# Patient Record
Sex: FEMALE | Race: WHITE | URBAN - METROPOLITAN AREA
[De-identification: names, ages, dates, MRNs, and addresses within clinical notes are randomized per-mention and may not be internally consistent; named-entity substitution may affect disease eponyms.]

---

## 2018-10-31 ENCOUNTER — HOSPITAL ENCOUNTER (OUTPATIENT)
Dept: PHYSICAL THERAPY | Age: 73
Setting detail: THERAPIES SERIES
Discharge: HOME | End: 2018-10-31
Payer: MEDICARE

## 2018-10-31 PROCEDURE — G8981 BODY POS CURRENT STATUS: HCPCS

## 2018-10-31 PROCEDURE — 97162 PT EVAL MOD COMPLEX 30 MIN: CPT

## 2018-10-31 PROCEDURE — 97140 MANUAL THERAPY 1/> REGIONS: CPT

## 2018-10-31 PROCEDURE — G8982 BODY POS GOAL STATUS: HCPCS

## 2018-10-31 ASSESSMENT — PAIN SCALES - GENERAL: PAINLEVEL_OUTOF10: 2

## 2018-10-31 NOTE — PROGRESS NOTES
WNL  AROM LLE (degrees)  LLE AROM : WNL  Spine  Lumbar: Full flexion. 50% decreased B SB with pain on R LB.  75% decreased B Rotation. 50% decreased extension with pain in R LB. Joint Mobility  Spine: R hip joint hypomobility  ROM RLE: Hypomobility R tibial IR. Strength RLE  Strength RLE: WFL  Comment: Hip ER 4-/5; Hip IR 4/5; Hip flexion 4/5; PGM 3-/5  Strength LLE  Strength LLE: WFL  Comment: Hip ER 4-/5; Hip IR 4/5; Hip flexion 4/5; PGM 3-/5     Additional Measures  Flexibility: Hip ER 20 degrees B hypomobility R hip joint with log roll. Decreased R hip ER and L IR. Special Tests: Decreased sensation to pin prick R LE dermatomes. Normal DTR B LEs. (+) relevant R slump test   Other: AGMR R hip. ERS lumbar spine. Assessment   Conditions Requiring Skilled Therapeutic Intervention  Assessment: Pt presents wtih swayback standing posture wtih decreased gluteal and core activation, increased lumbar compression. Movement impairments of the R lower quarter including R AGMR and ERS. Weakness in B gluteals specifically R PGM. Prognosis: Good  Decision Making: Medium Complexity  REQUIRES PT FOLLOW UP: Yes         Plan   Plan  Times per week: 2x/wk for 4 weeks   Current Treatment Recommendations: Strengthening, ROM, Gait Training, Endurance Training, Neuromuscular Re-education, Home Exercise Program, Manual Therapy - Soft Tissue Mobilization, Manual Therapy - Joint Manipulation, Patient/Caregiver Education & Training    G-Code  PT G-Codes  Functional Assessment Tool Used: LEFS  Score: 26% disability   Functional Limitation: Changing and maintaining body position  Changing and Maintaining Body Position Current Status (): At least 20 percent but less than 40 percent impaired, limited or restricted  Changing and Maintaining Body Position Goal Status ():  At least 1 percent but less than 20 percent impaired, limited or restricted    Goals  Long term goals  Time Frame for Long term goals : 4 weeks

## 2018-11-05 ENCOUNTER — HOSPITAL ENCOUNTER (OUTPATIENT)
Dept: PHYSICAL THERAPY | Age: 73
Setting detail: THERAPIES SERIES
Discharge: HOME | End: 2018-11-05
Payer: MEDICARE

## 2018-11-05 PROCEDURE — 97110 THERAPEUTIC EXERCISES: CPT

## 2018-11-05 PROCEDURE — 97140 MANUAL THERAPY 1/> REGIONS: CPT

## 2018-11-05 NOTE — FLOWSHEET NOTE
Squat test:  good overall form wtih lumbar flexion. SLS wtih increased hip IR and K' valgus on R.  SLS squat B hip IR and K' valgus R worse than L.  AMB:  R hip weakness noted, trendelenberg, LOB to R. Increased R hip IR and knee valgus. Decresaed gluteal activation, push off. Femoral amb. Lumbar: Full flexion. 50% decreased B SB with pain on R LB.  75% decreased B Rotation. 50% decreased extension with pain in R LB. Joint Mobility  Spine: R hip joint hypomobility  ROM RLE: Hypomobility R tibial IR.       Strength RLE  Strength RLE: WFL  Comment: Hip ER 4-/5; Hip IR 4/5; Hip flexion 4/5; PGM 3-/5  Strength LLE  Strength LLE: WFL  Comment: Hip ER 4-/5; Hip IR 4/5; Hip flexion 4/5; PGM 3-/5  Additional Measures  Flexibility: Hip ER 20 degrees B hypomobility R hip joint with log roll. Decreased R hip ER and L IR. Special Tests: Decreased sensation to pin prick R LE dermatomes. Normal DTR B LEs. (+) relevant R slump test   Other: AGMR R hip. ERS lumbar spine. ASSESSMENT:   Pt responded well to therapy today without increased pain.        Treatment/Activity Tolerance:   [x] Patient tolerated treatment well [] Patient limited by fatique  [] Patient limited by pain [] Patient limited by other medical complications  [] Other:     Goals:        Long term goals  Time Frame for Long term goals : 4 weeks   Long term goal 1: Pt independent with HEP  Long term goal 2: Pt amb without limits to pain  Long term goal 3: Pt hip strength improve by 1/3 muscle grade grossly  Long term goal 4: Normal hip joint mobility an d hip IR/ER  Long term goal 5: Pt able to walk without limits to pain     Plan: [x] Continue per plan of care [] Alter current plan (see comments)   [] Plan of care initiated [] Hold pending MD visit [] Discharge      Plan for Next Session:      Re-Certification Due Date:         Signature:  Nirav Arredondo, PT

## 2018-11-14 ENCOUNTER — HOSPITAL ENCOUNTER (OUTPATIENT)
Dept: PHYSICAL THERAPY | Age: 73
Setting detail: THERAPIES SERIES
Discharge: HOME | End: 2018-11-14
Payer: MEDICARE

## 2018-11-14 PROCEDURE — 97140 MANUAL THERAPY 1/> REGIONS: CPT

## 2018-11-14 PROCEDURE — 97110 THERAPEUTIC EXERCISES: CPT

## 2018-11-14 NOTE — FLOWSHEET NOTE
Modalities:      Test/Measurements:    11/14/18   R lumbar closing restriction    Observation: Standing:  L pelvic rotation, R trunk rotation. Sway back wtih Y-ligament standing. Body Mechanics: Squat test:  good overall form wtih lumbar flexion. SLS wtih increased hip IR and K' valgus on R.  SLS squat B hip IR and K' valgus R worse than L.  AMB:  R hip weakness noted, trendelenberg, LOB to R. Increased R hip IR and knee valgus. Decresaed gluteal activation, push off. Femoral amb. Lumbar: Full flexion. 50% decreased B SB with pain on R LB.  75% decreased B Rotation. 50% decreased extension with pain in R LB. Joint Mobility  Spine: R hip joint hypomobility  ROM RLE: Hypomobility R tibial IR.       Strength RLE  Strength RLE: WFL  Comment: Hip ER 4-/5; Hip IR 4/5; Hip flexion 4/5; PGM 3-/5  Strength LLE  Strength LLE: WFL  Comment: Hip ER 4-/5; Hip IR 4/5; Hip flexion 4/5; PGM 3-/5  Additional Measures  Flexibility: Hip ER 20 degrees B hypomobility R hip joint with log roll. Decreased R hip ER and L IR. Special Tests: Decreased sensation to pin prick R LE dermatomes. Normal DTR B LEs. (+) relevant R slump test   Other: AGMR R hip. ERS lumbar spine. ASSESSMENT:   Pt responded well to therapy today with decreased pain.        Treatment/Activity Tolerance:   [x] Patient tolerated treatment well [] Patient limited by fatique  [] Patient limited by pain [] Patient limited by other medical complications  [] Other:     Goals:        Long term goals  Time Frame for Long term goals : 4 weeks   Long term goal 1: Pt independent with HEP  Long term goal 2: Pt amb without limits to pain  Long term goal 3: Pt hip strength improve by 1/3 muscle grade grossly  Long term goal 4: Normal hip joint mobility an d hip IR/ER  Long term goal 5: Pt able to walk without limits to pain     Plan: [x] Continue per plan of care [] Alter current plan (see comments)   [] Plan of care initiated [] Hold pending MD visit []

## 2018-11-20 ENCOUNTER — HOSPITAL ENCOUNTER (OUTPATIENT)
Dept: PHYSICAL THERAPY | Age: 73
Setting detail: THERAPIES SERIES
Discharge: HOME | End: 2018-11-20
Payer: MEDICARE

## 2018-11-20 PROCEDURE — 97110 THERAPEUTIC EXERCISES: CPT

## 2018-11-20 PROCEDURE — 97140 MANUAL THERAPY 1/> REGIONS: CPT

## 2018-11-28 ENCOUNTER — HOSPITAL ENCOUNTER (OUTPATIENT)
Dept: PHYSICAL THERAPY | Age: 73
Setting detail: THERAPIES SERIES
Discharge: HOME | End: 2018-11-28
Payer: MEDICARE

## 2018-11-28 PROCEDURE — 97110 THERAPEUTIC EXERCISES: CPT

## 2018-11-28 PROCEDURE — 97140 MANUAL THERAPY 1/> REGIONS: CPT

## 2018-11-28 NOTE — FLOWSHEET NOTE
Physical Therapy Daily Treatment Note    Date:  2018    Patient Name:  Nicki Johns    :  [birth year]  MRN: [id number]  Restrictions/Precautions:    Medical/Treatment Diagnosis Information:  · Diagnosis: R Hip Pain  Insurance/Certification information:  PT Insurance Information: [employer], [place] [employer] HMO  Physician Information:  Referring Practitioner: Asha Che  Plan of care signed (Y/N): Y  Visit# / total visits:      G-Code (if applicable):         PT G-Codes  Functional Assessment Tool Used: LEFS  Score: 26% disability   Functional Limitation: Changing and maintaining body position  Changing and Maintaining Body Position Current Status (): At least 20 percent but less than 40 percent impaired, limited or restricted  Changing and Maintaining Body Position Goal Status (): At least 1 percent but less than 20 percent impaired, limited or restricted    Time in:   1:30      Timed Treatment:   30  Total Treatment Time:   30  ________________________________________________________________________________________    Pain Level:    1-2/10  SUBJECTIVE:  R side gluteal is still tight. Decreased pain in the R buttock in the am.  Definitely noticing strength gains since beginning therapy. R hip motion continues to be limited. Prone hip extension is difficult and increases LBP. OBJECTIVE:     Exercise/Equipment Resistance/Repetitions Other comments          TA sets with BP cuff       March    HEP video   Clamshells 1  HEP video   Bridging  HEP video    Prone hip extension  HEP video    Prone gluteal sets    HEP video           HS on board   NV    Sidelying lumbo pelvic stabilization       Pelvic PNF mod resist x4 mins     Hip IR/ER neuro re-ed   x3 mins R supine                                TG    x6 mins                            Other Therapeutic Activities:  Standing posture re-training with mirror previously    Manual Treatments:  R hip joint mobs LAD and SAD.   MWM ER R hip followed by hip neuro re-ed training. R lumbar gapping gr 3-4 L2-4. QL STM and release followed by breaking bread and CR QL stretching. Iliopsoas release followed by manual hip flexor stretching prone. .       Modalities:      Test/Measurements:    11/28/18   R hip hypomobility with decreased R hip ER and IR    Observation: Standing:  L pelvic rotation, R trunk rotation. Sway back wtih Y-ligament standing. Body Mechanics: Squat test:  good overall form wtih lumbar flexion. SLS wtih increased hip IR and K' valgus on R.  SLS squat B hip IR and K' valgus R worse than L.  AMB:  R hip weakness noted, trendelenberg, LOB to R. Increased R hip IR and knee valgus. Decresaed gluteal activation, push off. Femoral amb. Lumbar: Full flexion. 50% decreased B SB with pain on R LB.  75% decreased B Rotation. 50% decreased extension with pain in R LB. Joint Mobility  Spine: R hip joint hypomobility  ROM RLE: Hypomobility R tibial IR.       Strength RLE  Strength RLE: WFL  Comment: Hip ER 4-/5; Hip IR 4/5; Hip flexion 4/5; PGM 3-/5  Strength LLE  Strength LLE: WFL  Comment: Hip ER 4-/5; Hip IR 4/5; Hip flexion 4/5; PGM 3-/5  Additional Measures  Flexibility: Hip ER 20 degrees B hypomobility R hip joint with log roll. Decreased R hip ER and L IR. Special Tests: Decreased sensation to pin prick R LE dermatomes. Normal DTR B LEs. (+) relevant R slump test   Other: AGMR R hip. ERS lumbar spine. ASSESSMENT:   Pt responded well to therapy today with decreased pain.        Treatment/Activity Tolerance:   [x] Patient tolerated treatment well [] Patient limited by fatique  [] Patient limited by pain [] Patient limited by other medical complications  [] Other:     Goals:        Long term goals  Time Frame for Long term goals : 4 weeks   Long term goal 1: Pt independent with HEP  Long term goal 2: Pt amb without limits to pain  Long term goal 3: Pt hip strength improve by 1/3 muscle grade grossly  Long term goal 4:

## 2018-12-05 ENCOUNTER — HOSPITAL ENCOUNTER (OUTPATIENT)
Dept: PHYSICAL THERAPY | Age: 73
Setting detail: THERAPIES SERIES
Discharge: HOME | End: 2018-12-05
Payer: MEDICARE

## 2018-12-05 PROCEDURE — 97140 MANUAL THERAPY 1/> REGIONS: CPT

## 2018-12-05 PROCEDURE — 97110 THERAPEUTIC EXERCISES: CPT

## 2018-12-05 NOTE — FLOWSHEET NOTE
Physical Therapy Daily Treatment Note    Date:  2018    Patient Name:  Page Sheffield    :    MRN: [id number]  Restrictions/Precautions:    Medical/Treatment Diagnosis Information:  · Diagnosis: R Hip Pain  Insurance/Certification information:  PT Insurance Information: [employer], [place] [employer] HMO  Physician Information:  Referring Practitioner: Pranay Buenrostro  Plan of care signed (Y/N): Y  Visit# / total visits:      G-Code (if applicable):         PT G-Codes  Functional Assessment Tool Used: LEFS  Score: 26% disability   Functional Limitation: Changing and maintaining body position  Changing and Maintaining Body Position Current Status (): At least 20 percent but less than 40 percent impaired, limited or restricted  Changing and Maintaining Body Position Goal Status (): At least 1 percent but less than 20 percent impaired, limited or restricted    Time in:   1:30      Timed Treatment:  30  Total Treatment Time:  30  ________________________________________________________________________________________    Pain Level:    1  -2/10  SUBJECTIVE:  Pt notes her back is doing better. Pt states she is now doing her exercises a little differently in that she is using her buttock muscles to 'rotate' the femur in the joint. OBJECTIVE:     Exercise/Equipment Resistance/Repetitions Other comments          TA sets with BP cuff       March    HEP video   Clamshells 1 Reviewed x5 mins HEP video   Bridging Reviewed x5 mins  HEP video    Prone hip extension  HEP video    Prone gluteal sets    HEP video           HS on board   NV    Sidelying lumbo pelvic stabilization       Pelvic PNF mod resist     Hip IR/ER neuro re-ed   x5 mins R supine           Dowel gabby training   x7 mins  HEP video                  TG    x6 mins                            Other Therapeutic Activities: Lifting and squatting training x6 mins today    Manual Treatments:  R hip joint mobs LAD and SAD.   MWM ER R hip followed by hip neuro re-ed training. .       Modalities:      Test/Measurements:    11/28/18   R hip hypomobility with decreased R hip ER and IR    Observation: Standing:  L pelvic rotation, R trunk rotation. Sway back wtih Y-ligament standing. Body Mechanics: Squat test:  good overall form wtih lumbar flexion. SLS wtih increased hip IR and K' valgus on R.  SLS squat B hip IR and K' valgus R worse than L.  AMB:  R hip weakness noted, trendelenberg, LOB to R. Increased R hip IR and knee valgus. Decresaed gluteal activation, push off. Femoral amb. Lumbar: Full flexion. 50% decreased B SB with pain on R LB.  75% decreased B Rotation. 50% decreased extension with pain in R LB. Joint Mobility  Spine: R hip joint hypomobility  ROM RLE: Hypomobility R tibial IR.       Strength RLE  Strength RLE: WFL  Comment: Hip ER 4-/5; Hip IR 4/5; Hip flexion 4/5; PGM 3-/5  Strength LLE  Strength LLE: WFL  Comment: Hip ER 4-/5; Hip IR 4/5; Hip flexion 4/5; PGM 3-/5  Additional Measures  Flexibility: Hip ER 20 degrees B hypomobility R hip joint with log roll. Decreased R hip ER and L IR. Special Tests: Decreased sensation to pin prick R LE dermatomes. Normal DTR B LEs. (+) relevant R slump test   Other: AGMR R hip. ERS lumbar spine. ASSESSMENT:   Pt responded well to therapy today with decreased pain. Initiated functional movement training today with dowel gabby.       Treatment/Activity Tolerance:   [x] Patient tolerated treatment well [] Patient limited by fatique  [] Patient limited by pain [] Patient limited by other medical complications  [] Other:     Goals:        Long term goals  Time Frame for Long term goals : 4 weeks   Long term goal 1: Pt independent with HEP  Long term goal 2: Pt amb without limits to pain  Long term goal 3: Pt hip strength improve by 1/3 muscle grade grossly  Long term goal 4: Normal hip joint mobility an d hip IR/ER  Long term goal 5: Pt able to walk without limits to pain

## 2018-12-13 ENCOUNTER — HOSPITAL ENCOUNTER (OUTPATIENT)
Dept: PHYSICAL THERAPY | Age: 73
Setting detail: THERAPIES SERIES
Discharge: HOME | End: 2018-12-13
Payer: MEDICARE

## 2018-12-13 PROCEDURE — 97110 THERAPEUTIC EXERCISES: CPT

## 2018-12-13 PROCEDURE — 97140 MANUAL THERAPY 1/> REGIONS: CPT

## 2018-12-13 NOTE — FLOWSHEET NOTE
Physical Therapy Daily Treatment Note    Date:  2018    Patient Name:  Brenda Olson    :    MRN: [id number]  Restrictions/Precautions:    Medical/Treatment Diagnosis Information:  · Diagnosis: R Hip Pain  Insurance/Certification information:  PT Insurance Information: [employer], [place] [employer] HMO  Physician Information:  Referring Practitioner: Latonia Benavides  Plan of care signed (Y/N): Y  Visit# / total visits:      G-Code (if applicable):         PT G-Codes  Functional Assessment Tool Used: LEFS  Score: 26% disability   Functional Limitation: Changing and maintaining body position  Changing and Maintaining Body Position Current Status (): At least 20 percent but less than 40 percent impaired, limited or restricted  Changing and Maintaining Body Position Goal Status (): At least 1 percent but less than 20 percent impaired, limited or restricted    Time in:   1:00      Timed Treatment:  30  Total Treatment Time:  30  ________________________________________________________________________________________    Pain Level:    1-2/10  SUBJECTIVE:  Pt notes her back is doing better and she is stronger. Pt continues to c/o pain in the R hip and into L hip and LB as well.  Pt reports she is more conscious of her posture and movement with bending over and lifting so that she keeps her back in neutral.          OBJECTIVE:     Exercise/Equipment Resistance/Repetitions Other comments          TA sets with BP cuff       March    HEP video   Clamshells 1  HEP video   Bridging Reviewed x5 mins  HEP video    Prone hip extension  HEP video    Prone gluteal sets    HEP video    LTR supine   x15 B     HS on board   NV    Sidelying lumbo pelvic stabilization       Pelvic PNF mod resist     Hip IR/ER neuro re-ed   x5 mins R supine           Dowel gabby training   x7 mins  HEP video                  TG    x6 mins                            Other Therapeutic Activities: Lifting and squatting training x6 mins today    Manual Treatments:  R SI mob gr 3-4 without cavitation. R SI ligament scar massage with and without resisted IR/ER. Gr 3-4 S3 mobs and general sacral mobs to improve R SI mobility. R hip joint mobs LAD and SAD. MWM ER R hip followed by hip neuro re-ed training. R lumbar gapping gr 3-4 L2-4. QL STM and release followed by breaking bread and CR QL stretching. Modalities:      Test/Measurements:    11/28/18   R hip hypomobility with decreased R hip ER and IR  R SI hypomobility    Observation: Standing:  L pelvic rotation, R trunk rotation. Sway back wtih Y-ligament standing. Body Mechanics: Squat test:  good overall form wtih lumbar flexion. SLS wtih increased hip IR and K' valgus on R.  SLS squat B hip IR and K' valgus R worse than L.  AMB:  R hip weakness noted, trendelenberg, LOB to R. Increased R hip IR and knee valgus. Decresaed gluteal activation, push off. Femoral amb. Lumbar: Full flexion. 50% decreased B SB with pain on R LB.  75% decreased B Rotation. 50% decreased extension with pain in R LB. Joint Mobility  Spine: R hip joint hypomobility  ROM RLE: Hypomobility R tibial IR.       Strength RLE  Strength RLE: WFL  Comment: Hip ER 4-/5; Hip IR 4/5; Hip flexion 4/5; PGM 3-/5  Strength LLE  Strength LLE: WFL  Comment: Hip ER 4-/5; Hip IR 4/5; Hip flexion 4/5; PGM 3-/5  Additional Measures  Flexibility: Hip ER 20 degrees B hypomobility R hip joint with log roll. Decreased R hip ER and L IR. Special Tests: Decreased sensation to pin prick R LE dermatomes. Normal DTR B LEs. (+) relevant R slump test   Other: AGMR R hip. ERS lumbar spine. ASSESSMENT:   Pt responded well to therapy today with decreased pain. QL tenderness on L and SI hypomobility on R persist.  Continue to progress POC as tolerated.       Treatment/Activity Tolerance:   [x] Patient tolerated treatment well [] Patient limited by fatique  [] Patient limited by pain [] Patient limited by other

## 2018-12-19 ENCOUNTER — HOSPITAL ENCOUNTER (OUTPATIENT)
Dept: PHYSICAL THERAPY | Age: 73
Setting detail: THERAPIES SERIES
Discharge: HOME | End: 2018-12-19
Payer: MEDICARE

## 2018-12-19 PROCEDURE — 97140 MANUAL THERAPY 1/> REGIONS: CPT

## 2018-12-19 NOTE — FLOWSHEET NOTE
Physical Therapy Daily Treatment Note    Date:  2018    Patient Name:  Cassia Krause    :  59  MRN: [id number]  Restrictions/Precautions:    Medical/Treatment Diagnosis Information:  · Diagnosis: R Hip Pain  Insurance/Certification information:  PT Insurance Information: [employer], [place] [employer] HMO  Physician Information:  Referring Practitioner: Lionel Abraham  Plan of care signed (Y/N): Y  Visit# / total visits:      G-Code (if applicable):         PT G-Codes  Functional Assessment Tool Used: LEFS  Score: 18  20% disability   At initial evaluation 26% disability   Functional Limitation: Changing and maintaining body position  Changing and Maintaining Body Position Current Status (): At least 20 percent but less than 40 percent impaired, limited or restricted  Changing and Maintaining Body Position Goal Status (): At least 1 percent but less than 20 percent impaired, limited or restricted    Time in:   1:00      Timed Treatment:  30  Total Treatment Time:  30  ________________________________________________________________________________________    Pain Level:    1-2/10  SUBJECTIVE:  Pt notes her back is doing better and she is stronger. Pt continues to c/o pain in the R hip and into L hip and LB as well. Pt reports she is more conscious of her posture and movement with bending over and lifting so that she keeps her back in neutral.  But continues to c/o pain especially with side stepping in aerobics.            OBJECTIVE:     Exercise/Equipment Resistance/Repetitions Other comments          TA sets with BP cuff       March    HEP video   Clamshells 1  HEP video   Bridging Reviewed x5 mins  HEP video    Prone hip extension  HEP video    Prone gluteal sets    HEP video    LTR supine   x15 B     HS on board   NV    Sidelying lumbo pelvic stabilization       Pelvic PNF mod resist     Hip IR/ER neuro re-ed   x5 mins R supine           Dowel gabby training   x7 mins  HEP video

## 2018-12-26 ENCOUNTER — HOSPITAL ENCOUNTER (OUTPATIENT)
Dept: PHYSICAL THERAPY | Age: 73
Setting detail: THERAPIES SERIES
Discharge: HOME | End: 2018-12-26
Payer: MEDICARE

## 2018-12-26 PROCEDURE — 97110 THERAPEUTIC EXERCISES: CPT

## 2018-12-26 PROCEDURE — 97140 MANUAL THERAPY 1/> REGIONS: CPT

## 2020-10-07 ENCOUNTER — HOSPITAL ENCOUNTER (OUTPATIENT)
Age: 75
Setting detail: OBSERVATION
Discharge: HOME | End: 2020-10-09
Attending: EMERGENCY MEDICINE | Admitting: INTERNAL MEDICINE
Payer: MEDICARE

## 2020-10-07 ENCOUNTER — APPOINTMENT (OUTPATIENT)
Dept: CT IMAGING | Age: 75
End: 2020-10-07
Payer: MEDICARE

## 2020-10-07 ENCOUNTER — APPOINTMENT (OUTPATIENT)
Dept: GENERAL RADIOLOGY | Age: 75
End: 2020-10-07
Payer: MEDICARE

## 2020-10-07 PROBLEM — I48.91 ATRIAL FIBRILLATION WITH RAPID VENTRICULAR RESPONSE (HCC): Status: ACTIVE | Noted: 2020-10-07

## 2020-10-07 LAB
ANION GAP SERPL CALCULATED.3IONS-SCNC: 9 MMOL/L (ref 3–16)
APTT: 32.6 SEC (ref 24.2–36.2)
BASOPHILS ABSOLUTE: 0 K/UL (ref 0–0.2)
BASOPHILS RELATIVE PERCENT: 0.3 %
BUN BLDV-MCNC: 17 MG/DL (ref 7–20)
CALCIUM SERPL-MCNC: 9.6 MG/DL (ref 8.3–10.6)
CHLORIDE BLD-SCNC: 106 MMOL/L (ref 99–110)
CO2: 26 MMOL/L (ref 21–32)
CREAT SERPL-MCNC: 0.7 MG/DL (ref 0.6–1.2)
EOSINOPHILS ABSOLUTE: 0 K/UL (ref 0–0.6)
EOSINOPHILS RELATIVE PERCENT: 0.4 %
GFR AFRICAN AMERICAN: >60
GFR NON-AFRICAN AMERICAN: >60
GLUCOSE BLD-MCNC: 131 MG/DL (ref 70–99)
HCT VFR BLD CALC: 46.2 % (ref 36–48)
HEMOGLOBIN: 15.5 G/DL (ref 12–16)
INR BLD: 1.03 (ref 0.86–1.14)
LYMPHOCYTES ABSOLUTE: 2.9 K/UL (ref 1–5.1)
LYMPHOCYTES RELATIVE PERCENT: 36.2 %
MAGNESIUM: 2.1 MG/DL (ref 1.8–2.4)
MCH RBC QN AUTO: 31 PG (ref 26–34)
MCHC RBC AUTO-ENTMCNC: 33.5 G/DL (ref 31–36)
MCV RBC AUTO: 92.3 FL (ref 80–100)
MONOCYTES ABSOLUTE: 0.5 K/UL (ref 0–1.3)
MONOCYTES RELATIVE PERCENT: 6.7 %
NEUTROPHILS ABSOLUTE: 4.4 K/UL (ref 1.7–7.7)
NEUTROPHILS RELATIVE PERCENT: 56.4 %
PDW BLD-RTO: 13.6 % (ref 12.4–15.4)
PLATELET # BLD: 275 K/UL (ref 135–450)
PMV BLD AUTO: 8.8 FL (ref 5–10.5)
POTASSIUM REFLEX MAGNESIUM: 4 MMOL/L (ref 3.5–5.1)
PRO-BNP: 2163 PG/ML (ref 0–449)
PROTHROMBIN TIME: 12 SEC (ref 10–13.2)
RBC # BLD: 5 M/UL (ref 4–5.2)
SODIUM BLD-SCNC: 141 MMOL/L (ref 136–145)
TROPONIN: <0.01 NG/ML
TROPONIN: <0.01 NG/ML
TSH REFLEX: 0.61 UIU/ML (ref 0.27–4.2)
WBC # BLD: 7.9 K/UL (ref 4–11)

## 2020-10-07 PROCEDURE — G0378 HOSPITAL OBSERVATION PER HR: HCPCS

## 2020-10-07 PROCEDURE — 1200000000 HC SEMI PRIVATE

## 2020-10-07 PROCEDURE — 84443 ASSAY THYROID STIM HORMONE: CPT

## 2020-10-07 PROCEDURE — 83735 ASSAY OF MAGNESIUM: CPT

## 2020-10-07 PROCEDURE — 84484 ASSAY OF TROPONIN QUANT: CPT

## 2020-10-07 PROCEDURE — 36415 COLL VENOUS BLD VENIPUNCTURE: CPT

## 2020-10-07 PROCEDURE — 71260 CT THORAX DX C+: CPT

## 2020-10-07 PROCEDURE — 93005 ELECTROCARDIOGRAM TRACING: CPT | Performed by: EMERGENCY MEDICINE

## 2020-10-07 PROCEDURE — 83880 ASSAY OF NATRIURETIC PEPTIDE: CPT

## 2020-10-07 PROCEDURE — 96374 THER/PROPH/DIAG INJ IV PUSH: CPT

## 2020-10-07 PROCEDURE — 85730 THROMBOPLASTIN TIME PARTIAL: CPT

## 2020-10-07 PROCEDURE — 80048 BASIC METABOLIC PNL TOTAL CA: CPT

## 2020-10-07 PROCEDURE — 99285 EMERGENCY DEPT VISIT HI MDM: CPT

## 2020-10-07 PROCEDURE — 85025 COMPLETE CBC W/AUTO DIFF WBC: CPT

## 2020-10-07 PROCEDURE — 71045 X-RAY EXAM CHEST 1 VIEW: CPT

## 2020-10-07 PROCEDURE — 85610 PROTHROMBIN TIME: CPT

## 2020-10-07 PROCEDURE — 6370000000 HC RX 637 (ALT 250 FOR IP): Performed by: INTERNAL MEDICINE

## 2020-10-07 PROCEDURE — U0003 INFECTIOUS AGENT DETECTION BY NUCLEIC ACID (DNA OR RNA); SEVERE ACUTE RESPIRATORY SYNDROME CORONAVIRUS 2 (SARS-COV-2) (CORONAVIRUS DISEASE [COVID-19]), AMPLIFIED PROBE TECHNIQUE, MAKING USE OF HIGH THROUGHPUT TECHNOLOGIES AS DESCRIBED BY CMS-2020-01-R: HCPCS

## 2020-10-07 PROCEDURE — 2500000003 HC RX 250 WO HCPCS: Performed by: EMERGENCY MEDICINE

## 2020-10-07 PROCEDURE — 6360000004 HC RX CONTRAST MEDICATION: Performed by: INTERNAL MEDICINE

## 2020-10-07 PROCEDURE — 2580000003 HC RX 258: Performed by: EMERGENCY MEDICINE

## 2020-10-07 PROCEDURE — 2580000003 HC RX 258: Performed by: INTERNAL MEDICINE

## 2020-10-07 PROCEDURE — 6370000000 HC RX 637 (ALT 250 FOR IP): Performed by: EMERGENCY MEDICINE

## 2020-10-07 PROCEDURE — 96372 THER/PROPH/DIAG INJ SC/IM: CPT

## 2020-10-07 PROCEDURE — 6360000002 HC RX W HCPCS: Performed by: INTERNAL MEDICINE

## 2020-10-07 RX ORDER — GABAPENTIN 300 MG/1
600 CAPSULE ORAL 2 TIMES DAILY
COMMUNITY
Start: 2020-08-06 | End: 2020-11-04

## 2020-10-07 RX ORDER — POLYETHYLENE GLYCOL 3350 17 G/17G
17 POWDER, FOR SOLUTION ORAL DAILY PRN
Status: DISCONTINUED | OUTPATIENT
Start: 2020-10-07 | End: 2020-10-09 | Stop reason: HOSPADM

## 2020-10-07 RX ORDER — PROMETHAZINE HYDROCHLORIDE 25 MG/1
12.5 TABLET ORAL EVERY 6 HOURS PRN
Status: DISCONTINUED | OUTPATIENT
Start: 2020-10-07 | End: 2020-10-09 | Stop reason: HOSPADM

## 2020-10-07 RX ORDER — ACETAMINOPHEN 325 MG/1
650 TABLET ORAL EVERY 6 HOURS PRN
Status: DISCONTINUED | OUTPATIENT
Start: 2020-10-07 | End: 2020-10-09 | Stop reason: HOSPADM

## 2020-10-07 RX ORDER — GABAPENTIN 300 MG/1
600 CAPSULE ORAL 2 TIMES DAILY
Status: DISCONTINUED | OUTPATIENT
Start: 2020-10-07 | End: 2020-10-09 | Stop reason: HOSPADM

## 2020-10-07 RX ORDER — ACETAMINOPHEN 650 MG/1
650 SUPPOSITORY RECTAL EVERY 6 HOURS PRN
Status: DISCONTINUED | OUTPATIENT
Start: 2020-10-07 | End: 2020-10-09 | Stop reason: HOSPADM

## 2020-10-07 RX ORDER — METOPROLOL TARTRATE 5 MG/5ML
5 INJECTION INTRAVENOUS EVERY 5 MIN PRN
Status: DISCONTINUED | OUTPATIENT
Start: 2020-10-07 | End: 2020-10-09 | Stop reason: HOSPADM

## 2020-10-07 RX ORDER — ONDANSETRON 2 MG/ML
4 INJECTION INTRAMUSCULAR; INTRAVENOUS EVERY 6 HOURS PRN
Status: DISCONTINUED | OUTPATIENT
Start: 2020-10-07 | End: 2020-10-09 | Stop reason: HOSPADM

## 2020-10-07 RX ORDER — SODIUM CHLORIDE 0.9 % (FLUSH) 0.9 %
10 SYRINGE (ML) INJECTION PRN
Status: DISCONTINUED | OUTPATIENT
Start: 2020-10-07 | End: 2020-10-09 | Stop reason: HOSPADM

## 2020-10-07 RX ORDER — SODIUM CHLORIDE 0.9 % (FLUSH) 0.9 %
10 SYRINGE (ML) INJECTION EVERY 12 HOURS SCHEDULED
Status: DISCONTINUED | OUTPATIENT
Start: 2020-10-07 | End: 2020-10-09 | Stop reason: HOSPADM

## 2020-10-07 RX ORDER — 0.9 % SODIUM CHLORIDE 0.9 %
500 INTRAVENOUS SOLUTION INTRAVENOUS ONCE
Status: COMPLETED | OUTPATIENT
Start: 2020-10-07 | End: 2020-10-07

## 2020-10-07 RX ORDER — LEVOTHYROXINE SODIUM 0.12 MG/1
125 TABLET ORAL DAILY
Status: DISCONTINUED | OUTPATIENT
Start: 2020-10-08 | End: 2020-10-09 | Stop reason: HOSPADM

## 2020-10-07 RX ADMIN — IOPAMIDOL 80 ML: 755 INJECTION, SOLUTION INTRAVENOUS at 18:56

## 2020-10-07 RX ADMIN — GABAPENTIN 600 MG: 300 CAPSULE ORAL at 20:44

## 2020-10-07 RX ADMIN — Medication 10 ML: at 20:45

## 2020-10-07 RX ADMIN — ENOXAPARIN SODIUM 60 MG: 60 INJECTION SUBCUTANEOUS at 20:44

## 2020-10-07 RX ADMIN — METOPROLOL TARTRATE 25 MG: 25 TABLET, FILM COATED ORAL at 20:44

## 2020-10-07 RX ADMIN — SODIUM CHLORIDE 500 ML: 9 INJECTION, SOLUTION INTRAVENOUS at 13:39

## 2020-10-07 RX ADMIN — METOPROLOL TARTRATE 25 MG: 25 TABLET, FILM COATED ORAL at 15:29

## 2020-10-07 RX ADMIN — METOPROLOL TARTRATE 5 MG: 5 INJECTION INTRAVENOUS at 14:34

## 2020-10-07 ASSESSMENT — ENCOUNTER SYMPTOMS
CHEST TIGHTNESS: 0
ABDOMINAL PAIN: 0
VOMITING: 0
SHORTNESS OF BREATH: 0
NAUSEA: 0

## 2020-10-07 ASSESSMENT — PAIN SCALES - GENERAL
PAINLEVEL_OUTOF10: 0

## 2020-10-07 NOTE — ACP (ADVANCE CARE PLANNING)
ADVANCED CARE PLANNING    Name:Debbie Shah       :  1945              MRN:  [id number]      Purpose of Encounter: Advanced care planning in light of advanced age and admission with syncope, new atrial fibrillation. Parties in attendance: :Harsh Patton MD  Decisional Capacity:Yes    Diagnosis: Active Problems:    Atrial fibrillation with rapid ventricular response ([address]  Resolved Problems:    * No resolved hospital problems. *    Patients Medical Story:76 y.o. female with hx of hypothyroidism, peripheral neuropathy and chronic pain syndrome on medical marijuana, who presented to the ED with lightheadedness, recent syncopal episode. Was noted to be in afib RVR.      Goals of Care Determinations: Patient wishes to focus on recovery and management of her acute issues and return to her baseline. Plan: Will notify No primary care provider on file. of change in care plan. Will look at further interventions as needed. Code Status: At this time patient wishes to be Full Code  Time Spent with Patient: 18 minutes      Electronically signed by Harsh Machuca MD on 10/7/2020 at 4:33 PM  Thank you No primary care provider on file. for the opportunity to be involved in this patient's care.

## 2020-10-07 NOTE — H&P
Hospital Medicine History & Physical      PCP: No primary care provider on file. Date of Admission: 10/7/2020    Date of Service: Pt seen/examined on 10/7/2020 and Admitted to Inpatient with expected LOS greater than two midnights due to medical therapy. Chief Complaint:    Lightheadedness. Recent syncopal episode 1 week ago        History Of Present Illness:   76 y.o. female with hx of hypothyroidism, peripheral neuropathy and chronic pain syndrome on medical marijuana, who presented to the ED with lightheadedness. She had an episode of lightheadedness with pain and soreness in her bilateral arms (has shoulder pain from arthritis, shoulder surgeries) after using a plunger on her sink. About a week ago, while trying to place a heavy pot on a shelf,  she had an episode of lightheadedness and then had a transient syncopal event, which her  who was there told her, lasted about 15 seconds. She did not fall as she lowered herself to a seat when she was lightheaded. She felt fine afterwards and did not seek any intervention. She has not had chest pain or dyspnea. She denies seizues or seizure hx. She however states she has had intermittent lightheadedness over 2-3 years. She has also had lower substernal symptoms which she could best describe as palpitations, intermittently for many years. She has a hx of breast cancer and had chemo many years ago, which led to significant neuropathy, and she has hence attributted her palpitations and lightheadedness to that as well. She denies focal weakness. She denies any recent illness. She has not had recent fevers or chills. She has not had nausea, vomiting, or diarrhea. She has a history hypothyroidism on Synthroid, no history of high blood pressure, diabetes, or cardiac disease. No leg swelling or pain, or  recent travel, denied a history of DVT or PE. In the ED, she was afebrile.  HR was in the 140s on presentation, with /110. She had good sats on RA. Labs showed normal BMP and CBC. Troponin was < 0.01. Pro- BNP was elevated at 2,163. INR 1.03. EKG showed Atrial fibrillation, rate 134 with no acute ST- T wave changes. CXR showed: Hyperinflation. Calcified pulmonary granulomas. Normal cardiac mediastinal silhouette. At this time she denies any new complaints. Past Medical History:          Diagnosis Date    Breast cancer ([facility] [address] 2000    Neuropathy     Thyroid disease        Past Surgical History:          Procedure Laterality Date    TONSILLECTOMY         Medications Prior to Admission:      Prior to Admission medications    Medication Sig Start Date End Date Taking? Authorizing Provider   gabapentin (NEURONTIN) 300 MG capsule Take 600 mg by mouth 2 times daily. 8/6/20 11/4/20 Yes Historical Provider, MD   levothyroxine (SYNTHROID) 125 MCG tablet Take 125 mcg by mouth Daily   Yes Historical Provider, MD       Allergies:  Patient has no known allergies. Social History:    TOBACCO:   reports that she has never smoked. She has never used smokeless tobacco.  ETOH:   reports previous alcohol use. E-Cigarettes Vaping or Juuling     Questions Responses    Vaping Use     Start Date     Does device contain nicotine? Quit Date     Vaping Type             Family History:     Reviewed in detail and negative for DM, CAD, Cancer, CVA. Positive as follows:    History reviewed. No pertinent family history. REVIEW OF SYSTEMS:   Pertinent positives as noted in the HPI. All other systems reviewed and negative. PHYSICAL EXAM PERFORMED:    BP (!) 141/95   Pulse 109   Temp 97.9 °F (36.6 °C) (Oral)   Resp 19   Ht 5' 6\" (1.676 m)   Wt 141 lb (64 kg)   SpO2 94%   BMI 22.76 kg/m²     General appearance:  No apparent distress, appears stated age and cooperative. HEENT:  Normal cephalic, atraumatic without obvious deformity. Pupils equal, round, and reactive to light. Extra ocular muscles intact. episode abt a week ago, probably related, and or suggestive of cardiac disease. .     W/u shows elevated Pro-BNP, normal initial troponins    Other possible risk factor: thyroid disease and possible pulmonary disease, with calcified \"granulomas and hyperinflation\" noted on CXR; hx of breast cancer for which she had radiation txt and chemo. BP is elevated on presentation, but no hx of HTN. Review of records shows normal BPs in O/P PCP visit note from 8/2020. Check Mg    Update TSH with reflex  Cont synthroid 125 mcg daily    We will trend troponins  Check echocardiogram.    Check Chest CT to eval abnormal CXR, ricarda with hx of breast cancer, and txt for same. Keep on telemetry        [id number] Score for Atrial Fibrillation Stroke Risk   Risk   Factors  Component Value   C CHF No 0   H HTN No 0   A2 Age >= 76 Yes,  (69 y.o.) 2   D DM No 0   S2 Prior Stroke/TIA No 0   V Vascular Disease No 0   A Age 74-69 No,  (69 y.o.) 0   Sc Sex female 1    [id number]  Score  3   Score last updated 10/7/20 8:42 PM EDT    Click here for a link to the UpToDate guideline \"Atrial Fibrillation: Anticoagulation therapy to prevent embolization    Disclaimer: Risk Score calculation is dependent on accuracy of patient problem list and past encounter diagnosis. Will commence anticoagulation. Will place on therapeutic dose of SQ lovenox for now for stroke prophylaxis, pending w/u, and probably plan to transition to eliquis or Xarelto. Cardiology consult. Other medical issues:  Peripheral neuropathy: cont gabapentin 600 mg bid. Update vitamin B12. Chronic pain: medical marijuana                DVT Prophylaxis: On Lovenox  Diet: No diet orders on file  Code Status: No Order    PT/OT Eval Status: Ordered    Dispo - In patient       Saúl Najera MD    Thank you No primary care provider on file. for the opportunity to be involved in this patient's care.  If you have any questions or concerns please feel free to contact me at [phone].

## 2020-10-07 NOTE — DISCHARGE INSTR - COC
Continuity of Care Form    Patient Name: Sherri Brown   :    MRN:  [id number]    Admit date:  10/7/2020  Discharge date:  ***    Code Status Order: No Order   Advance Directives:     Admitting Physician:  No admitting provider for patient encounter. PCP: No primary care provider on file. Discharging Nurse: [facility] Unit/Room#: D86/V02-07  Discharging Unit Phone Number: ***    Emergency Contact:   Extended Emergency Contact Information  Primary Emergency Contact: Valentine Mcclain  Home Phone: [phone]  Relation: Spouse  Preferred language: English   needed? No    Past Surgical History:  Past Surgical History:   Procedure Laterality Date    TONSILLECTOMY         Immunization History:   Immunization History   Administered Date(s) Administered    Tdap (Boostrix, Adacel) 2017       Active Problems: There is no problem list on file for this patient.       Isolation/Infection:   Isolation          No Isolation        Patient Infection Status     None to display          Nurse Assessment:  Last Vital Signs: BP (!) 154/110   Pulse 140   Temp 97.9 °F (36.6 °C) (Oral)   Resp 13   Ht 5' 6\" (1.676 m)   Wt 141 lb (64 kg)   SpO2 98%   BMI 22.76 kg/m²     Last documented pain score (0-10 scale):    Last Weight:   Wt Readings from Last 1 Encounters:   10/07/20 141 lb (64 kg)     Mental Status:  {IP PT MENTAL STATUS:}    IV Access:  { DANIELLE IV ACCESS:675305938}    Nursing Mobility/ADLs:  Walking   {CHP DME SLSK:888464136}  Transfer  {CHP DME AIFN:253873484}  Bathing  {CHP DME TKAO:978297503}  Dressing  {CHP DME LTYT:650102881}  Toileting  {CHP DME WTHF:665457631}  Feeding  {CHP DME QXIO:947239467}  Med Admin  {CHP DME CMUB:038660681}  Med Delivery   { DANIELLE MED Delivery:022754972}    Wound Care Documentation and Therapy:        Elimination:  Continence:   · Bowel: {YES / IN:74694}  · Bladder: {YES / CE:71727}  Urinary Catheter: {Urinary Catheter:442438692} and transfer of Juma Casanova  is necessary for the continuing treatment of the diagnosis listed and that she requires {Admit to Appropriate Level of Care:26449} for {GREATER/LESS:010039068} 30 days.      Update Admission H&P: {CHP DME Changes in ALBNR:060787995}    PHYSICIAN SIGNATURE:  {Esignature:371523465}

## 2020-10-07 NOTE — ED PROVIDER NOTES
[address] [facility]          ATTENDING PHYSICIAN NOTE       Date of evaluation: 10/7/2020    Chief Complaint     Dizziness (had syncopal episode on Sept. 30, today was plunging out sink when arms and shoulders felt really sore, then a minute after started to feel dizzy and lightheaded)      History of Present Illness     Irina Barton is a 76 y.o. female who presents with pain and soreness in her bilateral arms today after using a plunger on her sink. She then had an episode of dizziness described as lightheadedness. She did not have any syncope or falls. Patient states something similar happened several weeks ago. She was trying to place a heavy pot on a shelf - she felt like she was straining her right arm. After she did this activity, she had an episode of dizziness described as lightheadedness and then had a syncopal event. She denied any falls at that time. She immediately regained consciousness. After today's event patient still notes soreness in her arms but denied any further dizziness. She denied any chest pain or chest pressure during this episode. Denied any shortness of breath. She denied any recent illness specifically respiratory illness or nausea, vomiting, diarrhea. Denied any recent fevers or chills. She has a history of arthritis and hypothyroidism on Synthroid, no history of high blood pressure, diabetes, or cardiac disease. She denied any recent travel, denied a history of DVT or PE. Review of Systems     Review of Systems   Constitutional: Positive for fatigue. Negative for activity change, appetite change, chills and fever. Respiratory: Negative for chest tightness and shortness of breath. Cardiovascular: Negative for chest pain. Gastrointestinal: Negative for abdominal pain, nausea and vomiting. Genitourinary: Negative for dysuria and hematuria. Musculoskeletal: Negative. Skin: Negative.     Neurological: Positive for dizziness, weakness and light-headedness. Negative for syncope, facial asymmetry, speech difficulty and numbness. Past Medical, Surgical, Family, and Social History     She has a past medical history of Breast cancer ([address] Neuropathy, and Thyroid disease. She has a past surgical history that includes Tonsillectomy. Her family history is not on file. She reports that she has never smoked. She has never used smokeless tobacco. She reports previous alcohol use. She reports current drug use. Drug: Marijuana. Medications     Previous Medications    GABAPENTIN (NEURONTIN) 300 MG CAPSULE    Take 600 mg by mouth 2 times daily. LEVOTHYROXINE (SYNTHROID) 125 MCG TABLET    Take 125 mcg by mouth Daily       Allergies     She has No Known Allergies. Physical Exam     INITIAL VITALS: BP: (!) 154/110, Temp: 97.9 °F (36.6 °C), Pulse: 140, Resp: 13, SpO2: 98 %   Physical Exam  Vitals signs reviewed. Constitutional:       Appearance: Normal appearance. She is normal weight. HENT:      Head: Normocephalic. Mouth/Throat:      Mouth: Mucous membranes are moist.   Cardiovascular:      Rate and Rhythm: Tachycardia present. Rhythm irregular. Pulses: Normal pulses. Heart sounds: Normal heart sounds. Pulmonary:      Effort: Pulmonary effort is normal. No respiratory distress. Breath sounds: Normal breath sounds. No stridor. No wheezing, rhonchi or rales. Musculoskeletal: Normal range of motion. Right lower leg: No edema. Left lower leg: No edema. Skin:     General: Skin is warm and dry. Capillary Refill: Capillary refill takes less than 2 seconds. Neurological:      General: No focal deficit present. Mental Status: She is alert. Mental status is at baseline.          Diagnostic Results       EKG at 1303  EKG Interpretation    Interpreted by me    Rhythm: Afib with RVR  Rate: 134  Axis: normal  Ectopy: none  Conduction: normal  ST Segments: no acute change  T Waves: no acute change  Q Waves: none    Clinical Impression: no acute infarction, no priors for comparison     RADIOLOGY:  XR CHEST PORTABLE   Final Result      Calcified pulmonary granulomas. Hyperinflation. Normal cardiac mediastinal silhouette. Bilateral shoulder arthroplasty noted.           LABS:   Results for orders placed or performed during the hospital encounter of 10/07/20   CBC auto differential   Result Value Ref Range    WBC 7.9 4.0 - 11.0 K/uL    RBC 5.00 4.00 - 5.20 M/uL    Hemoglobin 15.5 12.0 - 16.0 g/dL    Hematocrit 46.2 36.0 - 48.0 %    MCV 92.3 80.0 - 100.0 fL    MCH 31.0 26.0 - 34.0 pg    MCHC 33.5 31.0 - 36.0 g/dL    RDW 13.6 12.4 - 15.4 %    Platelets 889 829 - 373 K/uL    MPV 8.8 5.0 - 10.5 fL    Neutrophils % 56.4 %    Lymphocytes % 36.2 %    Monocytes % 6.7 %    Eosinophils % 0.4 %    Basophils % 0.3 %    Neutrophils Absolute 4.4 1.7 - 7.7 K/uL    Lymphocytes Absolute 2.9 1.0 - 5.1 K/uL    Monocytes Absolute 0.5 0.0 - 1.3 K/uL    Eosinophils Absolute 0.0 0.0 - 0.6 K/uL    Basophils Absolute 0.0 0.0 - 0.2 K/uL   Basic Metabolic Panel w/ Reflex to MG   Result Value Ref Range    Sodium 141 136 - 145 mmol/L    Potassium reflex Magnesium 4.0 3.5 - 5.1 mmol/L    Chloride 106 99 - 110 mmol/L    CO2 26 21 - 32 mmol/L    Anion Gap 9 3 - 16    Glucose 131 (H) 70 - 99 mg/dL    BUN 17 7 - 20 mg/dL    CREATININE 0.7 0.6 - 1.2 mg/dL    GFR Non-African American >60 >60    GFR African American >60 >60    Calcium 9.6 8.3 - 10.6 mg/dL   Troponin   Result Value Ref Range    Troponin <0.01 <0.01 ng/mL   Brain Natriuretic Peptide   Result Value Ref Range    Pro-BNP 2,163 (H) 0 - 449 pg/mL   PTT   Result Value Ref Range    aPTT 32.6 24.2 - 36.2 sec   PT - INR   Result Value Ref Range    Protime 12.0 10.0 - 13.2 sec    INR 1.03 0.86 - 1.14       RECENT VITALS:  BP: (!) 141/95,Temp: 97.9 °F (36.6 °C), Pulse: 109, Resp: 19, SpO2: 94 %     Procedures     none    ED Course     Nursing Notes, Past Medical Hx, with RVR ([facility]    2. Pulmonary granuloma ([facility]        Disposition     PATIENT REFERRED TO:  No follow-up provider specified.     DISCHARGE MEDICATIONS:  New Prescriptions    No medications on file       DISPOSITION       admit      Salima Johnson MD  10/13/20 1958

## 2020-10-08 PROBLEM — I48.91 ATRIAL FIBRILLATION (HCC): Status: ACTIVE | Noted: 2020-10-08

## 2020-10-08 LAB
CHOLESTEROL, TOTAL: 187 MG/DL (ref 0–199)
EKG ATRIAL RATE: 134 BPM
EKG DIAGNOSIS: NORMAL
EKG Q-T INTERVAL: 310 MS
EKG QRS DURATION: 80 MS
EKG QTC CALCULATION (BAZETT): 462 MS
EKG R AXIS: -42 DEGREES
EKG T AXIS: 58 DEGREES
EKG VENTRICULAR RATE: 134 BPM
HCT VFR BLD CALC: 44.1 % (ref 36–48)
HDLC SERPL-MCNC: 53 MG/DL (ref 40–60)
HEMOGLOBIN: 14.8 G/DL (ref 12–16)
LDL CHOLESTEROL CALCULATED: 103 MG/DL
MAGNESIUM: 2 MG/DL (ref 1.8–2.4)
MCH RBC QN AUTO: 30.9 PG (ref 26–34)
MCHC RBC AUTO-ENTMCNC: 33.6 G/DL (ref 31–36)
MCV RBC AUTO: 92 FL (ref 80–100)
PDW BLD-RTO: 13.5 % (ref 12.4–15.4)
PLATELET # BLD: 251 K/UL (ref 135–450)
PMV BLD AUTO: 9.1 FL (ref 5–10.5)
RBC # BLD: 4.79 M/UL (ref 4–5.2)
TRIGL SERPL-MCNC: 157 MG/DL (ref 0–150)
TROPONIN: <0.01 NG/ML
VLDLC SERPL CALC-MCNC: 31 MG/DL
WBC # BLD: 7.5 K/UL (ref 4–11)

## 2020-10-08 PROCEDURE — 83735 ASSAY OF MAGNESIUM: CPT

## 2020-10-08 PROCEDURE — 36415 COLL VENOUS BLD VENIPUNCTURE: CPT

## 2020-10-08 PROCEDURE — 84484 ASSAY OF TROPONIN QUANT: CPT

## 2020-10-08 PROCEDURE — G0378 HOSPITAL OBSERVATION PER HR: HCPCS

## 2020-10-08 PROCEDURE — 80061 LIPID PANEL: CPT

## 2020-10-08 PROCEDURE — 96372 THER/PROPH/DIAG INJ SC/IM: CPT

## 2020-10-08 PROCEDURE — 93005 ELECTROCARDIOGRAM TRACING: CPT | Performed by: STUDENT IN AN ORGANIZED HEALTH CARE EDUCATION/TRAINING PROGRAM

## 2020-10-08 PROCEDURE — 83036 HEMOGLOBIN GLYCOSYLATED A1C: CPT

## 2020-10-08 PROCEDURE — 6360000002 HC RX W HCPCS: Performed by: INTERNAL MEDICINE

## 2020-10-08 PROCEDURE — 85027 COMPLETE CBC AUTOMATED: CPT

## 2020-10-08 PROCEDURE — 99205 OFFICE O/P NEW HI 60 MIN: CPT | Performed by: INTERNAL MEDICINE

## 2020-10-08 PROCEDURE — 2580000003 HC RX 258: Performed by: INTERNAL MEDICINE

## 2020-10-08 PROCEDURE — 6370000000 HC RX 637 (ALT 250 FOR IP): Performed by: INTERNAL MEDICINE

## 2020-10-08 PROCEDURE — 6370000000 HC RX 637 (ALT 250 FOR IP): Performed by: STUDENT IN AN ORGANIZED HEALTH CARE EDUCATION/TRAINING PROGRAM

## 2020-10-08 RX ORDER — METOPROLOL TARTRATE 50 MG/1
50 TABLET, FILM COATED ORAL 2 TIMES DAILY
Status: DISCONTINUED | OUTPATIENT
Start: 2020-10-08 | End: 2020-10-09 | Stop reason: HOSPADM

## 2020-10-08 RX ADMIN — APIXABAN 5 MG: 5 TABLET, FILM COATED ORAL at 23:16

## 2020-10-08 RX ADMIN — GABAPENTIN 600 MG: 300 CAPSULE ORAL at 23:17

## 2020-10-08 RX ADMIN — Medication 10 ML: at 23:08

## 2020-10-08 RX ADMIN — METOPROLOL TARTRATE 50 MG: 50 TABLET, FILM COATED ORAL at 23:16

## 2020-10-08 RX ADMIN — GABAPENTIN 600 MG: 300 CAPSULE ORAL at 09:04

## 2020-10-08 RX ADMIN — LEVOTHYROXINE SODIUM 125 MCG: 125 TABLET ORAL at 06:04

## 2020-10-08 RX ADMIN — ENOXAPARIN SODIUM 60 MG: 60 INJECTION SUBCUTANEOUS at 09:05

## 2020-10-08 RX ADMIN — METOPROLOL TARTRATE 25 MG: 25 TABLET, FILM COATED ORAL at 09:05

## 2020-10-08 RX ADMIN — Medication 10 ML: at 09:05

## 2020-10-08 ASSESSMENT — PAIN SCALES - GENERAL
PAINLEVEL_OUTOF10: 0

## 2020-10-08 NOTE — PLAN OF CARE
Problem: Falls - Risk of:  Goal: Will remain free from falls  Description: Will remain free from falls  10/8/2020 1220 by Aureliano Dorantes RN  Outcome: Ongoing  Note: Pt alert x 4. Calls out with needs. Gait steady independently. Denies any pain or lightheadedness. HR increases with activity. Afib on tele. BP stable. Echo this afternoon. Will monitor safety.

## 2020-10-08 NOTE — PLAN OF CARE
Problem: Falls - Risk of:  Goal: Will remain free from falls  Description: Will remain free from falls  Outcome: Ongoing  Note: Pt remains free of falls this shift. Pt alert and oriented x4. Call light within reach. Will continue to monitor.

## 2020-10-08 NOTE — CARE COORDINATION
Referred to patient for d/c planning. Spoke to patient and . Patient is a 76year old female admitted for a fib. Patient usually lives at home with . Patient reports she is independent in ADLs. Patient denies d/c needs. Case Management Assessment           Initial Evaluation                Date / Time of Evaluation: 10/8/2020 1:34 PM                 Assessment Completed by: Maryam Chowdhury    Patient Name: Padilla Ayon     YOB: 1945  Diagnosis: Atrial fibrillation with rapid ventricular response [facility]) [I48.91]     Date / Time: 10/7/2020 12:47 PM    Patient Admission Status: Inpatient    If patient is discharged prior to next notation, then this note serves as note for discharge by case management. Current PCP: No primary care provider on file. Clinic Patient: No    Chart Reviewed: Yes  Patient/ Family Interviewed: Yes    Initial assessment completed at bedside with: Patient and     Hospitalization in the last 30 days: No    Emergency Contacts:  Extended Emergency Contact Information  Primary Emergency Contact: Angela Luis  Home Phone: [phone]  Mobile Phone: [phone]  Relation: Spouse  Preferred language: English   needed? No    Advance Directives:   Code Status: Full Code    Healthcare Power of : No      Financial  Payor: Samara Up / Plan: Elsie FINK HMO / Product Type: *No Product type* /     Pre-cert required for SNF: Yes    Pharmacy  No Pharmacies Listed    Potential assistance Purchasing Medications:    Does Patient want to participate in local refill/ meds to beds program?:      Meds To Beds General Rules:  1. Can ONLY be done Monday- Friday between 8:30am-5pm  2. Prescription(s) must be in pharmacy by 3pm to be filled same day  3. Copy of patient's insurance/ prescription drug card and patient face sheet must be sent along with the prescription(s)  4. Cost of Rx cannot be added to hospital bill.  If financial assistance is needed, please contact unit  or ;  or  CANNOT provide pharmacy voucher for patients co-pays  5. Patients can then  the prescription on their way out of the hospital at discharge, or pharmacy can deliver to the bedside if staff is available. (payment due at time of pick-up or delivery - cash, check, or card accepted)     Able to afford home medications/ co-pay costs: Yes    ADLS  Support Systems:      PT AM-PAC:   /24  OT AM-PAC:   /24    [place]: home with   Steps:     Plans to RETURN to current housing: Yes  Barriers to RETURNING to current housing: medical complications    Home Care Information  Currently ACTIVE with [address]: No  Home Care Agency: Not Applicable    Currently ACTIVE with [place] on Aging: No      Durable Medical Equipment  DME Provider:   Equipment: none    Home Oxygen and [address] [place] prior to admission: [address]: Not Applicable      DISCHARGE PLAN:  Disposition: Home- No Services Needed    Transportation PLAN for discharge: family     Factors facilitating achievement of predicted outcomes: Family support, Motivated, Cooperative and Pleasant    Barriers to discharge: Medical complications    Additional Case Management Notes: see above    The Plan for Transition of Care is related to the following treatment goals of Atrial fibrillation with rapid ventricular response ([address] [I48.91]    The Patient and/or patient representative [facility] and her family were provided with a choice of provider and agrees with the discharge plan Not Indicated    Freedom of choice list was provided with basic dialogue that supports the patient's individualized plan of care/goals and shares the quality data associated with the providers.  Not Indicated    Care Transition patient: No    ALEX Mcbride, JOSE ANTONIO  [facility] [employer]  Case Management Department  Ph: [phone]

## 2020-10-08 NOTE — PLAN OF CARE
Full note to follow  Heart rate controlled  TSH normal  Echo pending  Cardiology input pending  No bleeding issues      Depending upon the echo results and cardio input, might be able to release the patient later in the day  D/w patient

## 2020-10-08 NOTE — PROGRESS NOTES
crackles. GI: Abdomen soft, no tenderness, not distended, normal bowel sounds  Musculoskeletal: No cyanosis in digits, neck supple  Neurology: CN 2-12 grossly intact. No speech or motor deficits  Psych: Normal affect. Alert and oriented in time, place and person  Skin: Warm, dry, normal turgor  Extremity exam shows brisk capillary refill. Peripheral pulses are palpable in lower extremities     Labs and Tests:  CBC:   Recent Labs     10/07/20  1332 10/08/20  0610   WBC 7.9 7.5   HGB 15.5 14.8    251     BMP:    Recent Labs     10/07/20  1332      K 4.0      CO2 26   BUN 17   CREATININE 0.7   GLUCOSE 131*     Hepatic: No results for input(s): AST, ALT, ALB, BILITOT, ALKPHOS in the last 72 hours. CT CHEST W CONTRAST   Final Result      1. Mild groundglass opacities in the bilateral lower lungs with linear atelectasis and/or scarring, right greater than left. No localized consolidation or pleural effusion. 2.  No findings for intrathoracic metastatic disease. 3.  Evidence of remote granulomatous disease. 4.  Bilateral breast implants intact. XR CHEST PORTABLE   Final Result      Calcified pulmonary granulomas. Hyperinflation. Normal cardiac mediastinal silhouette. Bilateral shoulder arthroplasty noted. Problem List  Active Problems:    Atrial fibrillation with rapid ventricular response ([address]  Resolved Problems:    * No resolved hospital problems.  *       Assessment & Plan:   A. fib with RVR  New onset, TSH normal, echo pending, EP consulted, heart rate rate controlled on metoprolol, chads vas score 3, probably will need anticoagulation on discharge, awaiting input from EP    Hypothyroidism  Synthroid    IV Access: Peripheral  Martínez: No  Diet: DIET LOW SODIUM 2 GM;  Code:Full Code  DVT PPX Lovenox  Disposition unclear hopefully today or tomorrow      Magdaleno Dixon MD   10/8/2020 2:45 PM

## 2020-10-08 NOTE — DISCHARGE SUMMARY
[facility] DISCHARGE SUMMARY    Patient Demographics    Patient. Padilla Ayon  Date of Birth. 1945  MRN. [id number]     Primary care provider. No primary care provider on file. (Tel: None)    Admit date: 10/7/2020    Discharge date (blank if same as Note Date): Note Date: 10/9/2020     Reason for Hospitalization. Chief Complaint   Patient presents with    Dizziness     had syncopal episode on Sept. 30, today was plunging out sink when arms and shoulders felt really sore, then a minute after started to feel dizzy and lightheaded         Significant Findings. Active Problems:    Atrial fibrillation with rapid ventricular response (HCC)    Atrial fibrillation (HCC)    Atrial fibrillation with RVR (HCC)  Resolved Problems:    * No resolved hospital problems. *       Problems and results from this hospitalization that need follow up. 1. Atrial fibrillation    Significant test results and incidental findings. 1.   CT CHEST W CONTRAST   Final Result      1. Mild groundglass opacities in the bilateral lower lungs with linear atelectasis and/or scarring, right greater than left. No localized consolidation or pleural effusion. 2.  No findings for intrathoracic metastatic disease. 3.  Evidence of remote granulomatous disease. 4.  Bilateral breast implants intact. XR CHEST PORTABLE   Final Result      Calcified pulmonary granulomas. Hyperinflation. Normal cardiac mediastinal silhouette. Bilateral shoulder arthroplasty noted.      echo impression  Left ventricular cavity size is normal with mild concentric left ventricular   hypertrophy. Overall left ventricular systolic function appears normal with an estimated   ejection fraction of 55%. No regional wall motion abnormalities   Diastolic function is indeterminate due to abnormal heart rhythm. Estimated pulmonary artery systolic pressure is at 20 mmHg assuming a right   atrial pressure of 3 mmHg. No evidence of significant valvular stenosis or regurgitation  Invasive procedures and treatments. [address] Course. Patient admitted with near syncopal episode with palpitations found to have new onset A. fib with RVR. EP evaluation done during the stay. Patient will be discharged on Eliquis and metoprolol. Will need follow-up with cardiology as outpatient. See Echo results impression above. Consults. IP CONSULT TO HOSPITALIST  IP CONSULT TO CARDIOLOGY    Physical examination on discharge day. BP (!) 132/92   Pulse 79   Temp 97.7 °F (36.5 °C) (Oral)   Resp 18   Ht 5' 6\" (1.676 m)   Wt 144 lb 6.4 oz (65.5 kg)   SpO2 97%   BMI 23.31 kg/m²   General appearance. Alert. Looks comfortable. HEENT. Sclera clear. Moist mucus membranes. Cardiovascular. Regular rate and rhythm, normal S1, S2. No murmur. Respiratory. Not using accessory muscles. Clear to auscultation bilaterally, no wheeze. Gastrointestinal. Abdomen soft, non-tender, not distended, normal bowel sounds  Neurology. Facial symmetry. No speech deficits. Moving all extremities equally. Extremities. No edema in lower extremities. Skin. Warm, dry, normal turgor        Condition at time of discharge stable    Medication instructions provided to patient at discharge. Medication List      ASK your doctor about these medications    gabapentin 300 MG capsule  Commonly known as:  NEURONTIN  Ask about: Which instructions should I use?     levothyroxine 125 MCG tablet  Commonly known as:  SYNTHROID            Discharge recommendations given to patient. Follow Up. Cardiology and primary care provider Disposition. home  Activity. activity as tolerated  Diet: DIET LOW SODIUM 2 GM; Spent 25 minutes in discharge process.     Signed:  Liz Sharma MD     10/9/2020 4:28 PM

## 2020-10-09 VITALS
HEART RATE: 87 BPM | BODY MASS INDEX: 23.21 KG/M2 | OXYGEN SATURATION: 96 % | WEIGHT: 144.4 LBS | HEIGHT: 66 IN | DIASTOLIC BLOOD PRESSURE: 84 MMHG | SYSTOLIC BLOOD PRESSURE: 129 MMHG | RESPIRATION RATE: 18 BRPM | TEMPERATURE: 98 F

## 2020-10-09 LAB
EKG ATRIAL RATE: 375 BPM
EKG DIAGNOSIS: NORMAL
EKG Q-T INTERVAL: 304 MS
EKG QRS DURATION: 84 MS
EKG QTC CALCULATION (BAZETT): 392 MS
EKG R AXIS: 53 DEGREES
EKG T AXIS: 60 DEGREES
EKG VENTRICULAR RATE: 100 BPM
ESTIMATED AVERAGE GLUCOSE: 111.2 MG/DL
HBA1C MFR BLD: 5.5 %
LV EF: 55 %
LVEF MODALITY: NORMAL
MAGNESIUM: 1.9 MG/DL (ref 1.8–2.4)

## 2020-10-09 PROCEDURE — 6370000000 HC RX 637 (ALT 250 FOR IP): Performed by: INTERNAL MEDICINE

## 2020-10-09 PROCEDURE — G0008 ADMIN INFLUENZA VIRUS VAC: HCPCS | Performed by: INTERNAL MEDICINE

## 2020-10-09 PROCEDURE — 2580000003 HC RX 258: Performed by: INTERNAL MEDICINE

## 2020-10-09 PROCEDURE — 36415 COLL VENOUS BLD VENIPUNCTURE: CPT

## 2020-10-09 PROCEDURE — 99214 OFFICE O/P EST MOD 30 MIN: CPT | Performed by: NURSE PRACTITIONER

## 2020-10-09 PROCEDURE — 6370000000 HC RX 637 (ALT 250 FOR IP): Performed by: STUDENT IN AN ORGANIZED HEALTH CARE EDUCATION/TRAINING PROGRAM

## 2020-10-09 PROCEDURE — 83735 ASSAY OF MAGNESIUM: CPT

## 2020-10-09 PROCEDURE — 93010 ELECTROCARDIOGRAM REPORT: CPT | Performed by: INTERNAL MEDICINE

## 2020-10-09 PROCEDURE — 6360000002 HC RX W HCPCS: Performed by: INTERNAL MEDICINE

## 2020-10-09 PROCEDURE — 90686 IIV4 VACC NO PRSV 0.5 ML IM: CPT | Performed by: INTERNAL MEDICINE

## 2020-10-09 PROCEDURE — G0378 HOSPITAL OBSERVATION PER HR: HCPCS

## 2020-10-09 RX ORDER — METOPROLOL SUCCINATE 100 MG/1
100 TABLET, EXTENDED RELEASE ORAL DAILY
Qty: 30 TABLET | Refills: 3 | Status: SHIPPED | OUTPATIENT
Start: 2020-10-09 | End: 2020-12-03 | Stop reason: SDUPTHER

## 2020-10-09 RX ORDER — METOPROLOL SUCCINATE 25 MG/1
50 TABLET, EXTENDED RELEASE ORAL DAILY
Qty: 30 TABLET | Refills: 3 | Status: SHIPPED | OUTPATIENT
Start: 2020-10-09 | End: 2020-10-09 | Stop reason: SDUPTHER

## 2020-10-09 RX ADMIN — INFLUENZA A VIRUS A/VICTORIA/2454/2019 IVR-207 (H1N1) ANTIGEN (PROPIOLACTONE INACTIVATED), INFLUENZA A VIRUS A/HONG KONG/2671/2019 IVR-208 (H3N2) ANTIGEN (PROPIOLACTONE INACTIVATED), INFLUENZA B VIRUS B/VICTORIA/705/2018 BVR-11 ANTIGEN (PROPIOLACTONE INACTIVATED), INFLUENZA B VIRUS B/PHUKET/3073/2013 BVR-1B ANTIGEN (PROPIOLACTONE INACTIVATED) 0.5 ML: 15; 15; 15; 15 INJECTION, SUSPENSION INTRAMUSCULAR at 12:17

## 2020-10-09 RX ADMIN — Medication 10 ML: at 08:49

## 2020-10-09 RX ADMIN — GABAPENTIN 600 MG: 300 CAPSULE ORAL at 08:49

## 2020-10-09 RX ADMIN — METOPROLOL TARTRATE 50 MG: 50 TABLET, FILM COATED ORAL at 08:49

## 2020-10-09 RX ADMIN — APIXABAN 5 MG: 5 TABLET, FILM COATED ORAL at 08:49

## 2020-10-09 RX ADMIN — LEVOTHYROXINE SODIUM 125 MCG: 125 TABLET ORAL at 08:00

## 2020-10-09 NOTE — CARE COORDINATION
Case Management Assessment            Discharge Note                    Date / Time of Note: 10/9/2020 2:51 PM                  Discharge Note Completed by: Thee Reyes    Patient Name: Ruth Cook   YOB: 1945  Diagnosis: Atrial fibrillation with rapid ventricular response ([facility] [I48.91]  Atrial fibrillation ([facility] [address] [I48.91]   Date / Time: 10/7/2020 12:47 PM    Current PCP: No primary care provider on file. Clinic patient: No    Hospitalization in the last 30 days: No    Advance Directives:  Code Status: Full Code  [place] DNR form completed and on chart: No    Financial:  Payor: Monty  / Plan: [facility] HMO / Product Type: *No Product type* /      Pharmacy:  No Pharmacies [address] [place] [address] medications?:    Assistance provided by Case Management: None at this time    Does patient want to participate in local refill/ meds to beds program?:      Meds To Beds General Rules:  1. Can ONLY be done Monday- Friday between 8:30am-5pm  2. Prescription(s) must be in pharmacy by 3pm to be filled same day  3. Copy of patient's insurance/ prescription drug card and patient face sheet must be sent along with the prescription(s)  4. Cost of Rx cannot be added to hospital bill. If financial assistance is needed, please contact unit  or ;  or  CANNOT provide pharmacy voucher for patients co-pays  5.  Patients can then  the prescription on their way out of the hospital at discharge, or pharmacy can deliver to the bedside if staff is available. (payment due at time of pick-up or delivery - cash, check, or card accepted)     Able to afford home medications/ co-pay costs: Yes    ADLS:  Current PT AM-PAC Score:   /24  Current OT AM-PAC Score:   /24      DISCHARGE Disposition: Home- No Services Needed    LOC at discharge: Not Applicable  DANIELLE Completed: Not Indicated    Notification completed in HENS/PAS?:  Not Applicable    IMM Completed:   Not Indicated    Transportation:  Transportation PLAN for discharge: family   Mode of Transport: Private Car      Referrals made at [facility] for outpatient continued care:  Not Applicable    Additional CM Notes: Referred to patient for d/c planning. Patient continues to plan to return home with  on d/c. No d/c needs at this time. The Plan for Transition of Care is related to the following treatment goals of Atrial fibrillation with rapid ventricular response ([address] [I48.91]  Atrial fibrillation ([address] [I48.91]    The Patient and/or patient representative Juan Conteh and her family were provided with a choice of provider and agrees with the discharge plan Not Indicated    Freedom of choice list was provided with basic dialogue that supports the patient's individualized plan of care/goals and shares the quality data associated with the providers.  Not Indicated    Care Transitions patient: Denise Duff, ALEX, LISW-S  [facility]  Case Management Department  Ph: [phone]

## 2020-10-09 NOTE — PROGRESS NOTES
CC: a fib RVR, LH/dizziness and sycnope   HPI:   Elena Levine is a 76 y.o.  hypothyroidism, peripheral neuropathy and chronic pain syndrome on medical marijuana, who presented to the ED with lightheadedness.      Pt reported that on 9/30, she had an episode of syncope lasting 10 seconds, no prodromal symptoms, no post-ictal symptoms. Pt was putting some pans away felt lightheaded, sat down, and lost consciousness for 10 sec. Similar episode happened  yesterday which brought her to the ED, she was plunging in bathroom and then started feeling lightheaded.      She does reported occasional fatigue, lightheadedness and palpitations for the past few months. Denied cp, orthopnea, pnd, ZHOU.     Pt smokes marijuana everyday for the past 30 years.     In ED; hr was 140s was dx w/ afib and started on metop. S: No chest pain, sob or palpitations. Feels tired    Tele: A fib HR 93    O:  Physical Exam:  /79   Pulse 99   Temp 97.6 °F (36.4 °C) (Oral)   Resp 18   Ht 5' 6\" (1.676 m)   Wt 144 lb 6.4 oz (65.5 kg)   SpO2 94%   BMI 23.31 kg/m²    General (appearance):  No acute distress  Eyes: anicteric   Neck: soft, No JVD  Ears/Nose/Mouth/Thorat: No cyanosis  CV: Irreg, irreg   Respiratory:  Clear, normal effort  GI: soft, non-tender, non-distended  Skin: Warm, dry. No rashes  Neuro/Psych: Alert and oriented x 3. Appropriate behavior  Ext:  No c/c.  No edema  Pulses:  2+ radial     I.O's=     Weight  Admission: Weight: 141 lb (64 kg)   Today: Weight: 144 lb 6.4 oz (65.5 kg)    CBC:   Recent Labs     10/07/20  1332 10/08/20  0610   WBC 7.9 7.5   HGB 15.5 14.8   HCT 46.2 44.1   MCV 92.3 92.0    251     BMP:   Recent Labs     10/07/20  1332      K 4.0      CO2 26   BUN 17   CREATININE 0.7     Mag:   Lab Results   Component Value Date    MG 1.90 10/09/2020     PT/INR:   Recent Labs     10/07/20  1332   PROTIME 12.0   INR 1.03     APTT:   Recent Labs     10/07/20  1332   APTT 32.6     Pro-BNP:   Lab Results   Component Value Date    PROBNP 2,163 10/07/2020     CARDIAC ENZYMES:   Recent Labs     10/07/20  1332 10/07/20  2112 10/08/20  0207   TROPONINI <0.01 <0.01 <0.01     10/7/2020 TSH: 0.61    LIPIDS:   Lab Results   Component Value Date    CHOL 187 10/08/2020     Lab Results   Component Value Date    TRIG 157 (H) 10/08/2020     Lab Results   Component Value Date    HDL 53 10/08/2020     Lab Results   Component Value Date    LDLCALC 103 (H) 10/08/2020     Lab Results   Component Value Date    LABVLDL 31 10/08/2020     No results found for: CHOLHDLRATIO    Imaging:    10/8/2020 ECG: A fib     Assessment:  76 y.o. with LH/dizziness and syncope.   Found to have  A fib   Issues:  -A fib RVR  -Syncope   -HLD  -Thyroid disease  -Neuropathy  -Chronic pain     Plan:  Echo to be done today   Eliquis  Metoprolol 50 mg po bid

## 2020-10-09 NOTE — PROGRESS NOTES
[facility] PROGRESS NOTE    10/9/2020 4:30 PM        Name: Sherri Brown . Admitted: 10/7/2020  Primary Care Provider: No primary care provider on file. (Tel: None)    Brief Course:     Patient presenting with recurrent syncopal episode was found to have A. fib with RVR which is new onset for  CC: Rapid heart rate    Subjective:  . Patient feels better, hard of hearing  Episodes of A. fib with RVR on exertion metoprolol dose increased to 50 twice daily  No nausea no vomiting no shortness of breath no chest    Reviewed interval ancillary notes    Current Medications  apixaban (ELIQUIS) tablet 5 mg, BID  metoprolol tartrate (LOPRESSOR) tablet 50 mg, BID  metoprolol (LOPRESSOR) injection 5 mg, Q5 Min PRN  gabapentin (NEURONTIN) capsule 600 mg, BID  levothyroxine (SYNTHROID) tablet 125 mcg, Daily  sodium chloride flush 0.9 % injection 10 mL, 2 times per day  sodium chloride flush 0.9 % injection 10 mL, PRN  acetaminophen (TYLENOL) tablet 650 mg, Q6H PRN    Or  acetaminophen (TYLENOL) suppository 650 mg, Q6H PRN  polyethylene glycol (GLYCOLAX) packet 17 g, Daily PRN  promethazine (PHENERGAN) tablet 12.5 mg, Q6H PRN    Or  ondansetron (ZOFRAN) injection 4 mg, Q6H PRN  perflutren lipid microspheres (DEFINITY) injection 1.65 mg, ONCE PRN        Objective:  BP (!) 132/92   Pulse 79   Temp 97.7 °F (36.5 °C) (Oral)   Resp 18   Ht 5' 6\" (1.676 m)   Wt 144 lb 6.4 oz (65.5 kg)   SpO2 97%   BMI 23.31 kg/m²     Intake/Output Summary (Last 24 hours) at 10/9/2020 1630  Last data filed at 10/9/2020 0845  Gross per 24 hour   Intake 720 ml   Output --   Net 720 ml      Wt Readings from Last 3 Encounters:   10/07/20 144 lb 6.4 oz (65.5 kg)       General appearance:  Appears comfortable  Eyes: Sclera clear. Pupils equal.  ENT: Moist oral mucosa. Trachea midline, no adenopathy.   Cardiovascular: Irregular respiratory: Not using accessory muscles. Good inspiratory effort. Clear to auscultation bilaterally, no wheeze or crackles. GI: Abdomen soft, no tenderness, not distended, normal bowel sounds  Musculoskeletal: No cyanosis in digits, neck supple  Neurology: CN 2-12 grossly intact. No speech or motor deficits  Psych: Normal affect. Alert and oriented in time, place and person  Skin: Warm, dry, normal turgor  Extremity exam shows brisk capillary refill. Peripheral pulses are palpable in lower extremities     Labs and Tests:  CBC:   Recent Labs     10/07/20  1332 10/08/20  0610   WBC 7.9 7.5   HGB 15.5 14.8    251     BMP:    Recent Labs     10/07/20  1332      K 4.0      CO2 26   BUN 17   CREATININE 0.7   GLUCOSE 131*     Hepatic: No results for input(s): AST, ALT, ALB, BILITOT, ALKPHOS in the last 72 hours. CT CHEST W CONTRAST   Final Result      1. Mild groundglass opacities in the bilateral lower lungs with linear atelectasis and/or scarring, right greater than left. No localized consolidation or pleural effusion. 2.  No findings for intrathoracic metastatic disease. 3.  Evidence of remote granulomatous disease. 4.  Bilateral breast implants intact. XR CHEST PORTABLE   Final Result      Calcified pulmonary granulomas. Hyperinflation. Normal cardiac mediastinal silhouette. Bilateral shoulder arthroplasty noted. Problem List  Active Problems:    Atrial fibrillation with rapid ventricular response (HCC)    Atrial fibrillation (HCC)    Atrial fibrillation with RVR (HCC)  Resolved Problems:    * No resolved hospital problems.  *       Assessment & Plan:   A. fib with RVR  New onset, TSH normal, echo pending, Eliquis started, metoprolol, still awaiting echo results    Hypothyroidism  Synthroid    IV Access: Peripheral  Martínez: No  Diet: DIET LOW SODIUM 2 GM;  Code:Full Code  DVT PPX Lovenox  Disposition hopefully discharge today after echo results are available,      Elba Aguila MD 10/9/2020 4:30 PM

## 2020-10-09 NOTE — PLAN OF CARE
Problem: Falls - Risk of:  Goal: Absence of physical injury  Description: Absence of physical injury  Outcome: Ongoing  Note: At medium risk for falls per Terald Median scale. Oriented x 4. Ambulates to bathroom with steady gait. Needed items in reach. Clear path to bathroom. Will continue to monitor and plan of care. Problem: Activity:  Goal: Ability to tolerate increased activity will improve  Description: Ability to tolerate increased activity will improve  Outcome: Ongoing  Note: Denied feeling lightheaded overnight. VSS. Skin warm and dry. Will continue to monitor and plan of care. Problem: Cardiac:  Goal: Ability to maintain an adequate cardiac output will improve  Description: Ability to maintain an adequate cardiac output will improve  Outcome: Ongoing  Note: VSS overnight. Lungs clear. Tele has shown atrial fib with HR 92-110s. Skin warm and dry. Will continue to monitor and plan of care.

## 2020-10-09 NOTE — PLAN OF CARE
Problem: Falls - Risk of:  Goal: Will remain free from falls  Description: Will remain free from falls  Outcome: Ongoing  Note: Pt ambulates independently in room. Pt demonstrates a steady gait with no dizziness or lightheadedness. Pt educated on importance of calling for assistance if symptoms arise. Room is kept clean and clutter free. Pt demonstrates ease to ambulates in room. Problem: Activity:  Goal: Ability to tolerate increased activity will improve  Description: Ability to tolerate increased activity will improve  10/9/2020 1303 by Marlon Srivastava RN  Outcome: Ongoing  Note: Pt states that she still feels more tired than her baseline with activity. She is working towards increasing tolerance.

## 2020-10-10 LAB — SARS-COV-2, NAA: NOT DETECTED

## 2020-10-13 ENCOUNTER — OFFICE VISIT (OUTPATIENT)
Dept: CARDIOLOGY CLINIC | Age: 75
End: 2020-10-13
Payer: MEDICARE

## 2020-10-13 VITALS — DIASTOLIC BLOOD PRESSURE: 70 MMHG | HEART RATE: 54 BPM | SYSTOLIC BLOOD PRESSURE: 128 MMHG

## 2020-10-13 PROCEDURE — 4040F PNEUMOC VAC/ADMIN/RCVD: CPT | Performed by: NURSE PRACTITIONER

## 2020-10-13 PROCEDURE — G8428 CUR MEDS NOT DOCUMENT: HCPCS | Performed by: NURSE PRACTITIONER

## 2020-10-13 PROCEDURE — 1090F PRES/ABSN URINE INCON ASSESS: CPT | Performed by: NURSE PRACTITIONER

## 2020-10-13 PROCEDURE — G8400 PT W/DXA NO RESULTS DOC: HCPCS | Performed by: NURSE PRACTITIONER

## 2020-10-13 PROCEDURE — 1036F TOBACCO NON-USER: CPT | Performed by: NURSE PRACTITIONER

## 2020-10-13 PROCEDURE — 99214 OFFICE O/P EST MOD 30 MIN: CPT | Performed by: NURSE PRACTITIONER

## 2020-10-13 PROCEDURE — G8482 FLU IMMUNIZE ORDER/ADMIN: HCPCS | Performed by: NURSE PRACTITIONER

## 2020-10-13 PROCEDURE — 3017F COLORECTAL CA SCREEN DOC REV: CPT | Performed by: NURSE PRACTITIONER

## 2020-10-13 PROCEDURE — G8420 CALC BMI NORM PARAMETERS: HCPCS | Performed by: NURSE PRACTITIONER

## 2020-10-13 PROCEDURE — 1111F DSCHRG MED/CURRENT MED MERGE: CPT | Performed by: NURSE PRACTITIONER

## 2020-10-13 PROCEDURE — 1123F ACP DISCUSS/DSCN MKR DOCD: CPT | Performed by: NURSE PRACTITIONER

## 2020-10-13 PROCEDURE — 93005 ELECTROCARDIOGRAM TRACING: CPT | Performed by: NURSE PRACTITIONER

## 2020-10-13 NOTE — PROGRESS NOTES
CC/HPI:    76 y.o. with palpitations, dizziness, thyroid disease and new onset a fib RVR; now sinus bradycardia. Pt is feeling better. Palpitations and dizziness have improved. She denies cp, sob, or syncope. No LE edema, orthopnea or PND. She has chronic neuropathy in her extremities. She denies fever, chills, n/v/d or GI/ bleeding. She and her family/ arrived very upset and had many questions regarding hospitalization and A fib disease process. > 30 minutes spent answering questions. Past Medical History:   Diagnosis Date    Breast cancer ([facility] [address] 2000    Neuropathy     Thyroid disease      Past Surgical History:   Procedure Laterality Date    TONSILLECTOMY       No family history on file. Social History     Tobacco Use    Smoking status: Never Smoker    Smokeless tobacco: Never Used   Substance Use Topics    Alcohol use: Not Currently    Drug use: Yes     Types: Marijuana     Allergies:Patient has no known allergies. Review of Systems  General: No changes in weight, fatigue, or night sweats. HEENT: No blurry or decreased vision. No changes in hearing, nasal discharge or sore throat. Cardiovascular:  See HPI. Respiratory: No cough, hemoptysis, or wheezing. No history of asthma. Gastrointestinal:  No abdominal pain, hematochezia, melana, constipation, diarrhea, or history of GI ulcers. Genito-Urinary: No dysuria or hematuria. No urgency or polyuria. Musculoskeletal:  No complaints of joint pain, joint swelling or muscular weakness/soreness. Neurological:  No dizziness, headaches,  speech problems or weakness. No history of a stroke or TIA. + neuropathy   Psychological:  No anxiety or depression. Hematological and Lymphatic: No abnormal bleeding or bruising, blood clots, jaundice or swollen lymph nodes.   Endocrine:   No malaise/lethargy, palpitations, polydipsia/polyuria, temperature intolerance or unexpected weight changes  Skin:  No rashes or non-healing ulcers. Physical Exam:  /70   Pulse 54    General (appearance):  No acute distress  Eyes: anicteric   Neck: soft, No JVD  Ears/Nose/Mouth/Thorat: No cyanosis  CV: Reg, irma   Respiratory:  Clear  GI: soft, non-tender, non-distended  Skin: Warm, dry. No rashes  Neuro/Psych: Alert and oriented x3. Appropriate behavior  Ext:  No c/c. No edema  Pulses:  2+ radial     Weight  Wt Readings from Last 3 Encounters:   10/07/20 144 lb 6.4 oz (65.5 kg)          CBC:   Lab Results   Component Value Date    WBC 7.5 10/08/2020    HGB 14.8 10/08/2020    HCT 44.1 10/08/2020    MCV 92.0 10/08/2020     10/08/2020     BMP:  Lab Results   Component Value Date    CREATININE 0.7 10/07/2020    BUN 17 10/07/2020     10/07/2020    K 4.0 10/07/2020     10/07/2020    CO2 26 10/07/2020     Mag:   Lab Results   Component Value Date    MG 1.90 10/09/2020     LIVER PROFILE: No results found for: ALT, AST, GGT, ALKPHOS, BILITOT  PT/INR:   Lab Results   Component Value Date    INR 1.03 10/07/2020    PROTIME 12.0 10/07/2020     Pro-BNP   Lab Results   Component Value Date    PROBNP 2,163 10/07/2020     LIPIDS:  No components found for: CHLPL  Lab Results   Component Value Date    TRIG 157 (H) 10/08/2020     Lab Results   Component Value Date    HDL 53 10/08/2020     Lab Results   Component Value Date    LDLCALC 103 (H) 10/08/2020     Lab Results   Component Value Date    LABVLDL 31 10/08/2020     10/7/2020 TSH: 0.61    IMAGING:     10/13/2020 ECG: Sinus bradycardia    10/8/2020 ECG: A fib RVR     108/7/2020 ECG: A fib RVR    10/9/2020 Echo:   Left ventricular cavity size is normal with mild concentric left ventricular   hypertrophy. Overall left ventricular systolic function appears normal with an estimated   ejection fraction of 55%. No regional wall motion abnormalities   Diastolic function is indeterminate due to abnormal heart rhythm.    Estimated pulmonary artery systolic pressure is at 20 mmHg assuming a right

## 2020-11-03 PROBLEM — I48.91 ATRIAL FIBRILLATION (HCC): Status: RESOLVED | Noted: 2020-10-08 | Resolved: 2020-11-03

## 2020-12-02 NOTE — PROGRESS NOTES
Cardiac Electrophysiology Consultation  Via  Telehealth Evaluation - Audio/Visual (during YQGSB-98 public health emergency)        Date: 12/3/2020  Reason for Consultation: AF  Consult Requesting Physician: No att. providers found     Chief Complaint:   Chief Complaint   Patient presents with    Follow-Up from [facility]       HPI:    Peace Samuel (:  1945) is a 76 y.o. female who has requested an audio/video evaluation and has a history of AF.  female with PMH significant for hypothyroidism, peripheral neuropathy and chronic pain syndrome on medical marijuana. On 10/8/20, she presented to the ED with lightheadedness, found to be in AF RVR, started on metoprolol and Eliquis. She does report occasional fatigue, lightheadedness and palpitations for the past few months. Pt reported that on 20, she had an episode of syncope lasting 10 seconds, no prodromal symptoms, no post-ictal symptoms. Pt was putting some pans away felt lightheaded, sat down, and lost consciousness for 10 sec. Similar episode happened on 10/7/20 which brought her to the ED, she was plunging in bathroom and then started feeling lightheaded.      Interval History: Today, we had a audio conversation, over the telephone about management of atrial fibrillation. She denies any further recurrence of symptoms suggestive of atrial fibrillation. She is on Eliquis 5 mg p.o. twice daily and Toprol-XL. She denies to have any side effects to medications. Atrial Fibrillation:    BMI    :   There is no height or weight on file to calculate BMI.     Duration   :   10/2020    Symptoms   : Shortness of breath, palpitations    Previous DCCV :  none    Previous AAD           :   none    Beta blocker  :   Toprol    ACE / ARB  :   none    OAC   :   Eliquis    LVEF    :   55%    Left atrial size :   3.2 cm    RUBENS   :   not tested       Past Medical History:   Diagnosis Date    Breast cancer ([facility] [address]     Neuropathy     Thyroid disease         Past Surgical History:   Procedure Laterality Date    TONSILLECTOMY         Allergies:  No Known Allergies    Medication:   Prior to Admission medications    Medication Sig Start Date End Date Taking? Authorizing Provider   gabapentin (NEURONTIN) 300 MG capsule Take 600 mg by mouth 2 times daily. 11/24/20 2/22/21 Yes Historical Provider, MD   apixaban (ELIQUIS) 5 MG TABS tablet Take 1 tablet by mouth 2 times daily 10/9/20  Yes Diana Crowder MD   metoprolol succinate (TOPROL XL) 100 MG extended release tablet Take 1 tablet by mouth daily 10/9/20  Yes Diana Crowder MD   levothyroxine (SYNTHROID) 125 MCG tablet Take 125 mcg by mouth Daily   Yes Historical Provider, MD       Social History:   reports that she has never smoked. She has never used smokeless tobacco. She reports previous alcohol use. She reports current drug use. Drug: Marijuana. Family History:  family history is not on file. Reviewed. Denies family history of sudden cardiac death, arrhythmia, premature CAD    Review of System:    · General ROS: negative for - chills, fever   · Psychological ROS: negative for - anxiety or depression  · Ophthalmic ROS: negative for - eye pain or loss of vision  · ENT ROS: negative for - epistaxis, headaches, nasal discharge, sore throat   · Allergy and Immunology ROS: negative for - hives, nasal congestion   · Hematological and Lymphatic ROS: negative for - bleeding problems, blood clots, bruising or jaundice  · Endocrine ROS: negative for - skin changes, temperature intolerance or unexpected weight changes  · Respiratory ROS: negative for - cough, hemoptysis, pleuritic pain, SOB, sputum changes or wheezing  · Cardiovascular ROS: Per HPI.    · Gastrointestinal ROS: negative for - abdominal pain, blood in stools, diarrhea, hematemesis, melena, nausea/vomiting or swallowing difficulty/pain  · Genito-Urinary ROS: negative for - dysuria or incontinence  · Musculoskeletal ROS: negative for - joint swelling or muscle pain  · Neurological ROS: negative for - confusion, dizziness, gait disturbance, headaches, numbness/tingling, seizures, speech problems, tremors, visual changes or weakness  · Dermatological ROS: negative for - rash        PHYSICAL EXAMINATION:  Not performed as this is a audio visit. Labs:  Reviewed. ECG: deferred due to VV     Studies:   1. Event monitor:     2. Echo 10/9/20   Summary   Left ventricular cavity size is normal with mild concentric left ventricular   hypertrophy. Overall left ventricular systolic function appears normal with an estimated   ejection fraction of 55%. No regional wall motion abnormalities   Diastolic function is indeterminate due to abnormal heart rhythm. Estimated pulmonary artery systolic pressure is at 20 mmHg assuming a right   atrial pressure of 3 mmHg. No evidence of significant valvular stenosis or regurgitation     3. Stress Test:      4. Cath: The MCOT, echocardiogram, stress test, and coronary angiography/PCI were reviewed by myself and used for my plan of care. Procedures:  1. None    Assessment/Plan:   1. New onset AF RVR  2. Hypothyroidism  3. Chronic pain syndrome, smokes medical marjiuana    Decrease Toprol to 50 mg daily  Continue Eliquis 5 mg BID  Obtain 48 hr CAM   Follow up in 6 months with EP NP    Time spent 25 minutes    Thank you for allowing me to participate in the care of [facility]. All questions and concerns were addressed to the patient/family. Alternatives to my treatment were discussed. Ken Collado MD, personally performed the services prescribed in this documentation as scribed by Ms. Albert Earl RN in my presence and it is both accurate and complete. Faustino Mars RN, am scribing for and in the presence of Dr. Peetr Mathews.  12/03/20 2:24 PM  Albert Earl RN    [facility] is a 76 y.o. female being evaluated by a Virtual Visit (video visit) encounter to address concerns as mentioned above. A caregiver was present when appropriate. Due to this being a TeleHealth encounter (During JZRQR-95 public health emergency), evaluation of the following organ systems was limited: Vitals/Constitutional/EENT/Resp/CV/GI//MS/Neuro/Skin/Heme-Lymph-Imm. Pursuant to the emergency declaration under the [address] [place] [address] and the [employer] Act, this Virtual Visit was conducted with patient's (and/or legal guardian's) consent, to reduce the patient's risk of exposure to COVID-19 and provide necessary medical care. The patient (and/or legal guardian) has also been advised to contact this office for worsening conditions or problems, and seek emergency medical treatment and/or call 911 if deemed necessary. Services were provided through a video synchronous discussion virtually to substitute for in-person clinic visit. Patient and provider were located at their individual distinct locations. [facility]  Cardiac Electrophysiology  [address] [place] [address] [phone]  [facility]        An electronic signature was used to authenticate this note.

## 2020-12-03 ENCOUNTER — NURSE ONLY (OUTPATIENT)
Dept: CARDIOLOGY CLINIC | Age: 75
End: 2020-12-03

## 2020-12-03 ENCOUNTER — VIRTUAL VISIT (OUTPATIENT)
Dept: CARDIOLOGY CLINIC | Age: 75
End: 2020-12-03
Payer: MEDICARE

## 2020-12-03 PROCEDURE — 99214 OFFICE O/P EST MOD 30 MIN: CPT | Performed by: INTERNAL MEDICINE

## 2020-12-03 RX ORDER — GABAPENTIN 300 MG/1
600 CAPSULE ORAL 2 TIMES DAILY
COMMUNITY
Start: 2020-11-24 | End: 2021-02-22

## 2020-12-03 RX ORDER — METOPROLOL SUCCINATE 50 MG/1
100 TABLET, EXTENDED RELEASE ORAL DAILY
Qty: 90 TABLET | Refills: 3 | Status: ON HOLD | OUTPATIENT
Start: 2020-12-03 | End: 2022-09-18 | Stop reason: HOSPADM

## 2020-12-17 PROCEDURE — 0296T PR EXT ECG > 48HR TO 21 DAY RCRD W/CONECT INTL RCRD: CPT | Performed by: INTERNAL MEDICINE

## 2021-01-13 PROCEDURE — 93224 XTRNL ECG REC UP TO 48 HRS: CPT | Performed by: INTERNAL MEDICINE

## 2021-01-15 DIAGNOSIS — I48.91 ATRIAL FIBRILLATION WITH RVR (HCC): ICD-10-CM

## 2021-01-15 DIAGNOSIS — I48.91 ATRIAL FIBRILLATION WITH RAPID VENTRICULAR RESPONSE (HCC): ICD-10-CM

## 2021-06-01 NOTE — PROGRESS NOTES
[address]   Electrophysiology  Office Visit  Date: 6/7/2021    Chief Complaint   Patient presents with    Atrial Fibrillation    Palpitations    Tachycardia       Cardiac HX: Arian Bonilla is a 68 y.o.  woman who has a h/o AF, hypothyroidism, peripheral neuropathy and chronic pain syndrome on medical marijuana, in ED after a syncopal episode and lightheadedness, found to be in AF/RVR, started on metoprolol and Eliquis (10/8/20), Toprol decreased to 50 mg QD, CAM (12/17/20-12/19/20) shows avg HR 57 (), predominant rhythm SB, AF burden 14.9%, 4 episodes AT, longest lasting 18 beats    Interval History/HPI: Patient is here for a f/u for pAF and syncope. Patient has a history of syncope x2. The first episode happened on 9/29/2020 when she was punching out the sink, her arms and shoulders felt very sore then she became dizzy and lightheaded and had a syncopal event. She ended up going to the emergency room. The next day on 9/30/2020 she had another event. She was putting pants away, felt lightheaded, sat down and lost consciousness for 10 seconds. She had no prodromal symptoms or postictal symptoms with either event. She does have a history of paroxysmal atrial fibrillation and after the first episode on 9/29/2020 she was in atrial fibrillation when she was seen in the hospital.  She was placed on Toprol  mg daily and Eliquis on 10/8/2020. In follow-up her Toprol was changed to twice daily as she complains of fatigue. Today she states that she has had 5 episodes of AF in the last 6 months. They last about 24 hours in length and she has symptoms of fatigue and shortness of breath. Her heart rate is elevated when she is out of rhythm so she takes an extra Toprol. She does not convert to NSR right away however the Toprol lowers her heart rate. She does have a [employer] mobile device and it appears on the May 20 episode that she was in atrial fibrillation.   Normally she is sinus bradycardia in the 50s. She currently is not on any antiarrhythmic medications. She is on Eliquis and has not missed any doses. Most of her episodes happen either in the morning or wake her up from sleep. She has not had a sleep evaluation in the past.  She has had no further syncopal events. She does use medical marijuana daily for chronic pain. She has been doing this for 30 years. She did wear a CAM monitor 12/17/2020-12/19/2020 that showed an average HR 57, (), predominant rhythm was SB, AF burden 14.9% with 4 episodes of AT. Home medications:   Current Outpatient Medications on File Prior to Visit   Medication Sig Dispense Refill    gabapentin (NEURONTIN) 300 MG capsule Take 300 mg by mouth 2 times daily.  metoprolol succinate (TOPROL XL) 50 MG extended release tablet Take 2 tablets by mouth daily 90 tablet 3     No current facility-administered medications on file prior to visit. Past Medical History:   Diagnosis Date    Breast cancer ([facility] [address] 2000    Neuropathy     Thyroid disease         Past Surgical History:   Procedure Laterality Date    TONSILLECTOMY         No Known Allergies    Social History:  Reviewed. reports that she has never smoked. She has never used smokeless tobacco. She reports previous alcohol use. She reports current drug use. Drug: Marijuana. Family History:  Reviewed. family history is not on file. Review of System:    · Constitutional: No fevers, chills. · Eyes: No visual changes or diplopia. No scleral icterus. · ENT: No Headaches. No mouth sores or sore throat. · Cardiovascular: No for chest pain, No for dyspnea on exertion, No for palpitations or No for loss of consciousness. No cough, hemoptysis, No for pleuritic pain, or phlebitis. · Respiratory: No for cough or wheezing. No hematemesis. · Gastrointestinal: No abdominal pain, blood in stools. · Genitourinary: No dysuria, or hematuria.   · Musculoskeletal: No gait disturbance, · Integumentary: No rash or pruritis. · Neurological: No headache, change in muscle strength, numbness or tingling. · Psychiatric: No anxiety, or depression. · Endocrine: No temperature intolerance. No excessive thirst, fluid intake, or urination. · Hem/Lymph: No abnormal bruising or bleeding, blood clots or swollen lymph nodes. · Allergic/Immunologic: No nasal congestion or hives. Physical Examination:  Vitals:    06/07/21 1108   BP: 116/72   Pulse: 52         Wt Readings from Last 3 Encounters:   10/07/20 144 lb 6.4 oz (65.5 kg)       · Constitutional: Oriented. No distress. · Head: Normocephalic and atraumatic. · Mouth/Throat: Oropharynx is clear and moist.   · Eyes: Conjunctivae clear without jaunduice. PERRL. · Neck: Neck supple. No rigidity. No JVD present. · Cardiovascular: Normal rate, regular rhythm, S1&S2. · Pulmonary/Chest: Bilateral respiratory sounds. No wheezes, No rhonchi. · Abdominal: Soft. Bowel sounds present. No distension, No tenderness. · Musculoskeletal: No tenderness. No edema    · Lymphadenopathy: Has no cervical adenopathy. · Neurological: Alert and oriented. Cranial nerve appears intact, No Gross deficit   · Skin: Skin is warm and dry. No rash noted. · Psychiatric: Has a normal mood, affect and behavior     Labs:  Reviewed. No results for input(s): NA, K, CL, CO2, PHOS, BUN, CREATININE in the last 72 hours. Invalid input(s): CA,  TSH  No results for input(s): WBC, HGB, HCT, MCV, PLT in the last 72 hours.   Lab Results   Component Value Date    TROPONINI <0.01 10/08/2020     No results found for: BNP  Lab Results   Component Value Date    PROTIME 12.0 10/07/2020    INR 1.03 10/07/2020     Lab Results   Component Value Date    CHOL 187 10/08/2020    HDL 53 10/08/2020    TRIG 157 10/08/2020       ECG: Personally reviewed: SB, HR 52, , QRS 94, QTc 424    ECHO:  10.9.2020   Summary   Left ventricular cavity size is normal with mild concentric left ventricular   hypertrophy. Overall left ventricular systolic function appears normal with an estimated   ejection fraction of 55%. No regional wall motion abnormalities   Diastolic function is indeterminate due to abnormal heart rhythm. Estimated pulmonary artery systolic pressure is at 20 mmHg assuming a right   atrial pressure of 3 mmHg. No evidence of significant valvular stenosis or regurgitation    Stress Test: N/A    Cardiac Angiography: N/A    Problem List:   Patient Active Problem List    Diagnosis Date Noted    [facility]rial fibrillation with RVR ([facility] [address]     Atrial fibrillation with rapid ventricular response ([facility] [address] 10/07/2020        Assessment:   1. Paroxysmal atrial fibrillation (HCC)    2. Palpitation    3. Tachycardia         Cardiac HX: Debra Sanchez is a 68 y.o. woman who has a h/o pAF, hypothyroidism, peripheral neuropathy and chronic pain syndrome on medical marijuana, in ED after a syncopal episode and lightheadedness, found to be in AF/RVR, started on metoprolol and Eliquis (10/8/20), Toprol decreased to 50 mg QD, CAM (12/17/20-12/19/20) shows avg HR 57 (), predominant rhythm SB, AF burden 14.9%, 4 episodes AT, longest lasting 18 beats  [id number] 3. TSH 0.61 (10.7.2020). AF  - In NSR  - Patient has had episodes of AF on 2/4/21, 2/12/21, 3/23/21 and 5/20/21 - lasts about a day in length  - On Eliquis 5mg BID - no s/s bleeding - continue  - On Toprol 50 mg BID  - Sleep study - patient not interested  - Imer Friedman appears to be AF  - Will discuss with Dr. Rody Coulter re: using flecainide pill in the pocket vs daily dose  - F/u in 6 months  - ECG ordered and results personally reviewed       EF of 34%  No known systolic HF  No known CAD  Anticoagulation for AF  No Tobacco use. All questions and concerns were addressed to the patient/family. Alternatives to my treatment were discussed. The note was completed using EMR.  Every effort was made to ensure accuracy; however, inadvertent computerized transcription errors may be present. Patient received education regarding their diagnosis, treatment and medications while in the office today. Lilia Dempsey [facility]      I  have spent 35 minutes in care of the patient including direct face to face time, chart preparation, reviewing diagnostic testing, other provider notes and coordinating patient care.

## 2021-06-07 ENCOUNTER — OFFICE VISIT (OUTPATIENT)
Dept: CARDIOLOGY CLINIC | Age: 76
End: 2021-06-07
Payer: MEDICARE

## 2021-06-07 VITALS
BODY MASS INDEX: 23.31 KG/M2 | DIASTOLIC BLOOD PRESSURE: 72 MMHG | HEIGHT: 66 IN | SYSTOLIC BLOOD PRESSURE: 116 MMHG | HEART RATE: 52 BPM

## 2021-06-07 DIAGNOSIS — R00.0 TACHYCARDIA: ICD-10-CM

## 2021-06-07 DIAGNOSIS — R00.2 PALPITATION: ICD-10-CM

## 2021-06-07 DIAGNOSIS — I48.0 PAROXYSMAL ATRIAL FIBRILLATION (HCC): Primary | ICD-10-CM

## 2021-06-07 PROCEDURE — 1090F PRES/ABSN URINE INCON ASSESS: CPT | Performed by: NURSE PRACTITIONER

## 2021-06-07 PROCEDURE — G8420 CALC BMI NORM PARAMETERS: HCPCS | Performed by: NURSE PRACTITIONER

## 2021-06-07 PROCEDURE — 1123F ACP DISCUSS/DSCN MKR DOCD: CPT | Performed by: NURSE PRACTITIONER

## 2021-06-07 PROCEDURE — G8400 PT W/DXA NO RESULTS DOC: HCPCS | Performed by: NURSE PRACTITIONER

## 2021-06-07 PROCEDURE — 1036F TOBACCO NON-USER: CPT | Performed by: NURSE PRACTITIONER

## 2021-06-07 PROCEDURE — 99214 OFFICE O/P EST MOD 30 MIN: CPT | Performed by: NURSE PRACTITIONER

## 2021-06-07 PROCEDURE — 93000 ELECTROCARDIOGRAM COMPLETE: CPT | Performed by: NURSE PRACTITIONER

## 2021-06-07 PROCEDURE — G8427 DOCREV CUR MEDS BY ELIG CLIN: HCPCS | Performed by: NURSE PRACTITIONER

## 2021-06-07 PROCEDURE — 4040F PNEUMOC VAC/ADMIN/RCVD: CPT | Performed by: NURSE PRACTITIONER

## 2021-06-07 RX ORDER — GABAPENTIN 300 MG/1
300 CAPSULE ORAL 2 TIMES DAILY
COMMUNITY
End: 2022-11-03 | Stop reason: CLARIF

## 2021-06-07 RX ORDER — LEVOTHYROXINE SODIUM 0.12 MG/1
125 TABLET ORAL DAILY
Qty: 90 TABLET | Refills: 3 | Status: SHIPPED | OUTPATIENT
Start: 2021-06-07

## 2021-06-07 NOTE — PATIENT INSTRUCTIONS
Patient Education        flecainide  Pronunciation:  CORINA tiff shannan  Brand:  Tambocor  What is the most important information I should know about flecainide? You should not use this medicine if you have a serious heart condition such as bundle branch block or AV block (without a pacemaker), or if your heart cannot pump blood properly. You may receive your first dose in a hospital or clinic setting to quickly treat any serious side effects. What is flecainide? Flecainide is a Class IC anti-arrhythmic that is used in certain situations to prevent serious heart rhythm disorders. Flecainide may also be used for purposes not listed in this medication guide. What should I discuss with my healthcare provider before taking flecainide? You should not use flecainide if you are allergic to it, or if:  · you have a serious heart condition such as bundle branch block or AV block (unless you have a pacemaker); or  · your heart cannot pump blood properly. Tell your doctor if you have ever had:  · a heart attack;  · chronic atrial fibrillation, or \"AFib\";  · congestive heart failure;  · a heart condition called \"sick sinus syndrome\";  · an electrolyte imbalance (such as low levels of potassium or magnesium in your blood); or  · liver disease. It is not known whether this medicine will harm an unborn baby. Tell your doctor if you are pregnant or plan to become pregnant. It may not be safe to breast-feed while using this medicine. Ask your doctor about any risk. How should I take flecainide? Follow all directions on your prescription label and read all medication guides or instruction sheets. Your doctor may occasionally change your dose. Use the medicine exactly as directed. You may receive your first few doses in a hospital or clinic setting to quickly treat any serious side effects. Your heart rate will be monitored using an electrocardiograph or ECG (sometimes called an EKG).  This will help your doctor determine how long to treat you with flecainide. You may also need frequent blood tests to check your liver or kidney function. Store at room temperature away from moisture, heat, and light. What happens if I miss a dose? Take the medicine as soon as you can, but skip the missed dose if it is almost time for your next dose. Do not take two doses at one time. What happens if I overdose? Seek emergency medical attention or call the Poison Help line at 1-189.167.1584. Overdose symptoms may include nausea, vomiting, slow heart rate, fainting, or seizure (convulsions). What should I avoid while taking flecainide? Follow your doctor's instructions about any restrictions on food, beverages, or activity. What are the possible side effects of flecainide? Get emergency medical help if you have signs of an allergic reaction:  hives; difficulty breathing; swelling of your face, lips, tongue, or throat. Call your doctor at once if you have:  · fast or pounding heartbeats;  · fluttering in your chest, shortness of breath, and sudden dizziness (like you might pass out);  · slow heart rate, weak pulse, slow breathing (breathing may stop);  · feeling short of breath;  · swelling, rapid weight gain;  · pale skin, easy bruising or bleeding, unusual weakness;  · fever, flu-like symptoms; or  · jaundice (yellowing of the skin or eyes). Common side effects may include:  · dizziness;  · vision problems;  · trouble breathing;  · headache;  · nausea; or  · feeling weak or tired. This is not a complete list of side effects and others may occur. Call your doctor for medical advice about side effects. You may report side effects to FDA at 6-880-SDZ-2613. What other drugs will affect flecainide?   Tell your doctor about all your other medicines, especially:  · digoxin;  · a diuretic or \"water pill\";  · a beta-blocker (atenolol, metoprolol, propranolol, sotalol, and others);  · other heart medications such as amiodarone, diltiazem, disopyramide, nifedipine, quinidine, or verapamil; or  · seizure medication. This list is not complete. Other drugs may affect flecainide, including prescription and over-the-counter medicines, vitamins, and herbal products. Not all possible drug interactions are listed here. Where can I get more information? Your pharmacist can provide more information about flecainide. Remember, keep this and all other medicines out of the reach of children, never share your medicines with others, and use this medication only for the indication prescribed. Every effort has been made to ensure that the information provided by [address] is accurate, up-to-date, and complete, but no guarantee is made to that effect. Drug information contained herein may be time sensitive. [facility] information has been compiled for use by healthcare practitioners and consumers in the [place] and therefore [facility] does not warrant that uses outside of the [place] are appropriate, unless specifically indicated otherwise. [facility]'s drug information does not endorse drugs, diagnose patients or recommend therapy. [facility] drug information is an informational resource designed to assist licensed healthcare practitioners in caring for their patients and/or to serve consumers viewing this service as a supplement to, and not a substitute for, the expertise, skill, knowledge and judgment of healthcare practitioners. The absence of a warning for a given drug or drug combination in no way should be construed to indicate that the drug or drug combination is safe, effective or appropriate for any given patient. [facility] does not assume any responsibility for any aspect of healthcare administered with the aid of information [facility] provides. The information contained herein is not intended to cover all possible uses, directions, precautions, warnings, drug interactions, allergic reactions, or adverse effects.  If you have questions about the drugs you are taking, check with your doctor, nurse or pharmacist.  Copyright 2901-2848 [address]: 4.01. Revision date: 1/15/2019. Care instructions adapted under license by [facility] ([facility]). If you have questions about a medical condition or this instruction, always ask your healthcare professional. [address] any warranty or liability for your use of this information. Patient Education        Sleep Apnea: Care Instructions  Overview     Sleep apnea means that you frequently stop breathing for 10 seconds or longer during sleep. It can be mild to severe, based on the number of times an hour that you stop breathing or have slowed breathing. Blocked or narrowed airways in your nose, mouth, or throat can cause sleep apnea. Your airway can become blocked when your throat muscles and tongue relax during sleep. You can help treat sleep apnea at home by making lifestyle changes. You also can use a CPAP breathing machine that keeps tissues in the throat from blocking your airway. Or your doctor may suggest that you use a breathing device while you sleep. It helps keep your airway open. This could be a device that you put in your mouth. In some cases, surgery may be needed to remove enlarged tissues in the throat. Follow-up care is a key part of your treatment and safety. Be sure to make and go to all appointments, and call your doctor if you are having problems. It's also a good idea to know your test results and keep a list of the medicines you take. How can you care for yourself at home? · Lose weight, if needed. · Sleep on your side. It may help mild apnea. · Avoid alcohol and medicines such as sleeping pills, opioids, or sedatives before bed. · Don't smoke. If you need help quitting, talk to your doctor. · Prop up the head of your bed. · Treat breathing problems, such as a stuffy nose, that are caused by a cold or allergies.   · Try a continuous positive airway pressure (CPAP) breathing machine if your doctor recommends it. · If CPAP doesn't work for you, ask your doctor if you can try other masks, settings, or breathing machines. · Try oral breathing devices or other nasal devices. · Talk to your doctor if your nose feels dry or bleeds, or if it gets runny or stuffy when you use a breathing machine. · Tell your doctor if you're sleepy during the day and it affects your daily life. Don't drive or operate machinery when you're drowsy. When should you call for help? Watch closely for changes in your health, and be sure to contact your doctor if:    · You still have sleep apnea even though you have made lifestyle changes.     · You are thinking of trying a device such as CPAP.     · You are having problems using a CPAP or similar machine.     · You are still sleepy during the day, and it affects your daily life. Where can you learn more? Go to https://Electronic Sound Magazine.[email or web address] org and sign in to your [employer] account. Enter G523 in the WEEZEVENT box to learn more about \"Sleep Apnea: Care Instructions. \"     If you do not have an account, please click on the \"Sign Up Now\" link. Current as of: October 26, 2020               Content Version: 12.8  © 2006-2021 Healthwise, [employer]. Care instructions adapted under license by [facility] ([facility]). If you have questions about a medical condition or this instruction, always ask your healthcare professional. [place] [address] any warranty or liability for your use of this information. Patient Education        Learning About Sleep Apnea  What is it? Sleep apnea means that breathing stops for short periods during sleep. When you stop breathing or have reduced airflow into your lungs during sleep, you don't sleep well and you can be very tired during the day. The oxygen levels in your blood may go down, and carbon dioxide levels go up.  It may lead to other problems, such as high blood pressure and heart disease. Sleep apnea can range from mild to severe, based on how often breathing stops during sleep. Breathing may stop as few as 5 times an hour (mild apnea) to 30 or more times an hour (severe apnea). Obstructive sleep apnea is the most common type. This most often occurs because your airways are blocked or partly blocked. Central sleep apnea is less common. It happens when the brain has trouble controlling breathing. Some people have both types. That's called complex sleep apnea. What are the symptoms? There are symptoms of sleep apnea that you may notice and symptoms that others may notice when you're asleep. Symptoms you may notice include:  · Feeling extremely sleepy during the day. · Feeling unrefreshed or tired after a night's sleep. · Problems with memory and concentration, or mood changes. · Morning headaches. · Getting up often during the night to urinate. · A dry mouth or sore throat in the morning. Your bed partner may notice that you:  · Have episodes of not breathing. · Snore loudly. Almost all people who have sleep apnea snore. But not all people who snore have sleep apnea. · Toss and turn during sleep. · Have nighttime choking or gasping spells. How is it diagnosed? Your doctor will probably do a physical exam and ask about your past health. The doctor may also ask you or your bed partner about your snoring and sleep behavior and how tired you feel during the day. Your doctor may suggest a sleep study. Sleep studies are a series of tests that look at what happens to the body during sleep. They check for how often you stop breathing or have too little air flowing into your lungs during sleep. They also find out how much oxygen you have in your blood during sleep. A sleep study may take place in your home. Or it might take place at a sleep center, where you will spend the night.   If your sleep apnea doesn't improve with treatment, you may have more tests to find out what's causing it. How is it treated? You may be able to help treat sleep apnea by making some lifestyle changes. You could try to lose weight, sleep on your side, and avoid alcohol and medicines like sedatives before bed. Sleep apnea is often treated with machines that deliver air through a mask to help keep your airways open. These include:  · Continuous positive airway pressure (CPAP). This increases air pressure in your throat. It keeps your airway open when you breathe in. It's the most common device. · Bilevel positive airway pressure (BiPAP). This uses different air pressures when you breathe in and out. · Adaptive servo ventilation (ASV). It senses pauses in breathing and adjusts air pressure. It's mostly used for central sleep apnea. You can also try oral breathing devices or nasal devices. If your tonsils or other tissues are blocking your airway, your doctor may suggest surgery to open the airway. How can you care for yourself at home? · Lose weight, if needed. · Sleep on your side. It may help mild apnea. · Avoid alcohol and medicines such as sleeping pills, opioids, or sedatives before bed. · Don't smoke. If you need help quitting, talk to your doctor. · Prop up the head of your bed. · Treat breathing problems, such as a stuffy nose, that are caused by a cold or allergies. · Try a continuous positive airway pressure (CPAP) breathing machine if your doctor recommends it. · If CPAP doesn't work for you, ask your doctor if you can try other masks, settings, or breathing machines. · Try oral breathing devices or other nasal devices. · Talk to your doctor if your nose feels dry or bleeds, or if it gets runny or stuffy when you use a breathing machine. · Tell your doctor if you're sleepy during the day and it affects your daily life. Don't drive or operate machinery when you're drowsy. Where can you learn more? Go to https://reena.Ludei. org and sign in to your [employer] account. makeup, fingernail polish, or fake nails. · Pack and take along a small overnight bag with personal items, such as a toothbrush, a comb, favorite pillows or blankets, and a book. You can wear your own nightclothes. · If you will have portable sleep monitoring, your doctor will explain how to use the equipment at home. How is the test done? · In the sleep lab, you will be in a private room, much like a hotel room. · Small pads or patches called electrodes will be placed on your head and body with a small amount of glue and tape. These will record things like brain activity, eye movement, oxygen levels, and snoring. · Soft elastic belts will be placed around your chest and belly to measure your breathing. · Your blood oxygen levels will be checked by a small clip (oximeter) placed either on the tip of your index finger or on your earlobe. · If you have sleep apnea, you may wear a mask that is connected to a continuous positive airway pressure (CPAP) machine. · Depending on the type of test, you will be allowed to sleep through the night or you'll be awakened periodically and asked to stay awake for a while. · If you use portable sleep monitoring, follow the instructions your doctor gave you. How long does the test take? · You will stay in the sleep lab overnight. For some tests, you will also stay part of the next day. What happens after the test?  · You will be able to go home right away. · You may not sleep well during the test and may be tired the next day. · You can go back to your usual activities right away. · After your sleep problem has been identified, you may need a second study if your doctor orders treatment such as CPAP. Follow-up care is a key part of your treatment and safety. Be sure to make and go to all appointments, and call your doctor if you are having problems. It's also a good idea to keep a list of the medicines you take.  Ask your doctor when you can expect to have your test results. Where can you learn more? Go to https://chpepiceweb.[email or web address] org and sign in to your [employer] account. Enter C426 in the Lot78 box to learn more about \"Sleep Studies: About This Test.\"     If you do not have an account, please click on the \"Sign Up Now\" link. Current as of: October 26, 2020               Content Version: 12.8  © 2006-2021 Healthwise, Incorporated. Care instructions adapted under license by [facility] ([facility]). If you have questions about a medical condition or this instruction, always ask your healthcare professional. [address] any warranty or liability for your use of this information.

## 2021-06-21 ENCOUNTER — TELEPHONE (OUTPATIENT)
Dept: CARDIOLOGY | Age: 76
End: 2021-06-21

## 2021-06-21 DIAGNOSIS — I48.0 PAROXYSMAL ATRIAL FIBRILLATION (HCC): Primary | ICD-10-CM

## 2021-06-21 RX ORDER — FLECAINIDE ACETATE 100 MG/1
100 TABLET ORAL 2 TIMES DAILY
Qty: 60 TABLET | Refills: 3 | Status: ON HOLD | OUTPATIENT
Start: 2021-06-21 | End: 2022-09-18 | Stop reason: HOSPADM

## 2021-06-21 NOTE — TELEPHONE ENCOUNTER
Patient called to speak with . she doesn't want a call back from NP she would like  only. She isn't starting prescription until he calls her with her  on the line.  Please advise [phone]

## 2021-06-21 NOTE — PROGRESS NOTES
Called and [facility] for patient to start flecainide 100 mg BID and an EKG in 1 week. Asked patient to return call to verify she received the message.

## 2021-06-21 NOTE — TELEPHONE ENCOUNTER
Spoke with the pt and her . They are unwilling to schedule OV with [facility] to discuss starting flecainide. They wish to seek a second opinion.

## 2021-12-08 ENCOUNTER — TELEPHONE (OUTPATIENT)
Dept: CARDIOLOGY CLINIC | Age: 76
End: 2021-12-08

## 2021-12-08 NOTE — TELEPHONE ENCOUNTER
LVM to see if pt would like to reschedule missed ppt with Maddy Cruz NP.  She may be following up at [facility].

## 2021-12-10 ENCOUNTER — TELEPHONE (OUTPATIENT)
Dept: CARDIOLOGY CLINIC | Age: 76
End: 2021-12-10

## 2021-12-10 NOTE — TELEPHONE ENCOUNTER
I spoke to patient   Patient is seeing a different cardiologist. She will not be scheduling with [facility]

## 2022-07-05 NOTE — TELEPHONE ENCOUNTER
Requested Prescriptions     Pending Prescriptions Disp Refills    apixaban (ELIQUIS) 5 MG TABS tablet 180 tablet 3     Sig: Take 1 tablet by mouth 2 times daily          Last Office Visit: 6/7/21    Next Office Visit: Visit date not found       SPOKE WITH PATIENT AND SHE IS NO LONGER SEEING US.  SHE IS SEEING A DR AT [facility].

## 2022-09-12 ENCOUNTER — APPOINTMENT (OUTPATIENT)
Dept: GENERAL RADIOLOGY | Age: 77
DRG: 308 | End: 2022-09-12
Payer: MEDICARE

## 2022-09-12 ENCOUNTER — HOSPITAL ENCOUNTER (EMERGENCY)
Age: 77
Discharge: HOME | DRG: 308 | End: 2022-09-12
Attending: EMERGENCY MEDICINE
Payer: MEDICARE

## 2022-09-12 VITALS
RESPIRATION RATE: 18 BRPM | HEIGHT: 66 IN | SYSTOLIC BLOOD PRESSURE: 125 MMHG | BODY MASS INDEX: 24.81 KG/M2 | HEART RATE: 69 BPM | TEMPERATURE: 98.6 F | DIASTOLIC BLOOD PRESSURE: 77 MMHG | OXYGEN SATURATION: 97 % | WEIGHT: 154.4 LBS

## 2022-09-12 DIAGNOSIS — R53.83 OTHER FATIGUE: ICD-10-CM

## 2022-09-12 DIAGNOSIS — U07.1 COVID-19: Primary | ICD-10-CM

## 2022-09-12 LAB
A/G RATIO: 1.5 (ref 1.1–2.2)
ALBUMIN SERPL-MCNC: 3.9 G/DL (ref 3.4–5)
ALP BLD-CCNC: 76 U/L (ref 40–129)
ALT SERPL-CCNC: 20 U/L (ref 10–40)
ANION GAP SERPL CALCULATED.3IONS-SCNC: 11 MMOL/L (ref 3–16)
AST SERPL-CCNC: 28 U/L (ref 15–37)
BASOPHILS ABSOLUTE: 0 K/UL (ref 0–0.2)
BASOPHILS RELATIVE PERCENT: 0.2 %
BILIRUB SERPL-MCNC: 0.3 MG/DL (ref 0–1)
BUN BLDV-MCNC: 12 MG/DL (ref 7–20)
CALCIUM SERPL-MCNC: 8.8 MG/DL (ref 8.3–10.6)
CHLORIDE BLD-SCNC: 99 MMOL/L (ref 99–110)
CO2: 24 MMOL/L (ref 21–32)
CREAT SERPL-MCNC: 0.8 MG/DL (ref 0.6–1.2)
EKG ATRIAL RATE: 62 BPM
EKG DIAGNOSIS: NORMAL
EKG P AXIS: 46 DEGREES
EKG P-R INTERVAL: 168 MS
EKG Q-T INTERVAL: 394 MS
EKG QRS DURATION: 82 MS
EKG QTC CALCULATION (BAZETT): 399 MS
EKG R AXIS: -49 DEGREES
EKG T AXIS: 57 DEGREES
EKG VENTRICULAR RATE: 62 BPM
EOSINOPHILS ABSOLUTE: 0 K/UL (ref 0–0.6)
EOSINOPHILS RELATIVE PERCENT: 0 %
GFR AFRICAN AMERICAN: >60
GFR NON-AFRICAN AMERICAN: >60
GLUCOSE BLD-MCNC: 96 MG/DL (ref 70–99)
HCT VFR BLD CALC: 47.3 % (ref 36–48)
HEMOGLOBIN: 15.2 G/DL (ref 12–16)
INFLUENZA A: NOT DETECTED
INFLUENZA B: NOT DETECTED
LYMPHOCYTES ABSOLUTE: 2 K/UL (ref 1–5.1)
LYMPHOCYTES RELATIVE PERCENT: 22.3 %
MCH RBC QN AUTO: 30.3 PG (ref 26–34)
MCHC RBC AUTO-ENTMCNC: 32.2 G/DL (ref 31–36)
MCV RBC AUTO: 94.1 FL (ref 80–100)
MONOCYTES ABSOLUTE: 1 K/UL (ref 0–1.3)
MONOCYTES RELATIVE PERCENT: 10.7 %
NEUTROPHILS ABSOLUTE: 6.1 K/UL (ref 1.7–7.7)
NEUTROPHILS RELATIVE PERCENT: 66.8 %
PDW BLD-RTO: 14.1 % (ref 12.4–15.4)
PLATELET # BLD: 164 K/UL (ref 135–450)
PMV BLD AUTO: 9.6 FL (ref 5–10.5)
POTASSIUM REFLEX MAGNESIUM: 3.9 MMOL/L (ref 3.5–5.1)
RBC # BLD: 5.03 M/UL (ref 4–5.2)
S PYO AG THROAT QL: NEGATIVE
SARS-COV-2 RNA, RT PCR: DETECTED
SODIUM BLD-SCNC: 134 MMOL/L (ref 136–145)
TOTAL PROTEIN: 6.5 G/DL (ref 6.4–8.2)
WBC # BLD: 9.1 K/UL (ref 4–11)

## 2022-09-12 PROCEDURE — 3E0333Z INTRODUCTION OF ANTI-INFLAMMATORY INTO PERIPHERAL VEIN, PERCUTANEOUS APPROACH: ICD-10-PCS | Performed by: EMERGENCY MEDICINE

## 2022-09-12 PROCEDURE — 85025 COMPLETE CBC W/AUTO DIFF WBC: CPT

## 2022-09-12 PROCEDURE — 96372 THER/PROPH/DIAG INJ SC/IM: CPT

## 2022-09-12 PROCEDURE — 87636 SARSCOV2 & INF A&B AMP PRB: CPT

## 2022-09-12 PROCEDURE — 80053 COMPREHEN METABOLIC PANEL: CPT

## 2022-09-12 PROCEDURE — 87880 STREP A ASSAY W/OPTIC: CPT

## 2022-09-12 PROCEDURE — 6360000002 HC RX W HCPCS: Performed by: EMERGENCY MEDICINE

## 2022-09-12 PROCEDURE — 71045 X-RAY EXAM CHEST 1 VIEW: CPT

## 2022-09-12 PROCEDURE — 36415 COLL VENOUS BLD VENIPUNCTURE: CPT

## 2022-09-12 PROCEDURE — 93005 ELECTROCARDIOGRAM TRACING: CPT | Performed by: EMERGENCY MEDICINE

## 2022-09-12 PROCEDURE — 87081 CULTURE SCREEN ONLY: CPT

## 2022-09-12 PROCEDURE — 96360 HYDRATION IV INFUSION INIT: CPT

## 2022-09-12 PROCEDURE — 2580000003 HC RX 258: Performed by: EMERGENCY MEDICINE

## 2022-09-12 PROCEDURE — 99285 EMERGENCY DEPT VISIT HI MDM: CPT

## 2022-09-12 RX ORDER — DEXAMETHASONE SODIUM PHOSPHATE 4 MG/ML
10 INJECTION, SOLUTION INTRA-ARTICULAR; INTRALESIONAL; INTRAMUSCULAR; INTRAVENOUS; SOFT TISSUE ONCE
Status: COMPLETED | OUTPATIENT
Start: 2022-09-12 | End: 2022-09-12

## 2022-09-12 RX ORDER — SODIUM CHLORIDE, SODIUM LACTATE, POTASSIUM CHLORIDE, AND CALCIUM CHLORIDE .6; .31; .03; .02 G/100ML; G/100ML; G/100ML; G/100ML
500 INJECTION, SOLUTION INTRAVENOUS ONCE
Status: COMPLETED | OUTPATIENT
Start: 2022-09-12 | End: 2022-09-12

## 2022-09-12 RX ORDER — DEXAMETHASONE SODIUM PHOSPHATE 4 MG/ML
10 INJECTION, SOLUTION INTRA-ARTICULAR; INTRALESIONAL; INTRAMUSCULAR; INTRAVENOUS; SOFT TISSUE EVERY 6 HOURS
Status: DISCONTINUED | OUTPATIENT
Start: 2022-09-12 | End: 2022-09-12

## 2022-09-12 RX ADMIN — DEXAMETHASONE SODIUM PHOSPHATE 10 MG: 4 INJECTION, SOLUTION INTRAMUSCULAR; INTRAVENOUS at 13:31

## 2022-09-12 RX ADMIN — SODIUM CHLORIDE, POTASSIUM CHLORIDE, SODIUM LACTATE AND CALCIUM CHLORIDE 500 ML: 600; 310; 30; 20 INJECTION, SOLUTION INTRAVENOUS at 12:06

## 2022-09-12 ASSESSMENT — ENCOUNTER SYMPTOMS
VOMITING: 0
ABDOMINAL PAIN: 0
DIARRHEA: 1
SHORTNESS OF BREATH: 0
COUGH: 1
NAUSEA: 0

## 2022-09-12 ASSESSMENT — PAIN - FUNCTIONAL ASSESSMENT: PAIN_FUNCTIONAL_ASSESSMENT: NONE - DENIES PAIN

## 2022-09-12 NOTE — ED PROVIDER NOTES
[address] [facility]          ATTENDING PHYSICIAN NOTE       Date of evaluation: 9/12/2022    Chief Complaint     Fatigue, Pharyngitis, and Tachycardia (Started feeling bad Sat thinks it could be covid)      History of Present Illness     Joanne Simpson is a 68 y.o. female who presents with complaints of generalized weakness, fatigue, fever, diarrhea, and sore throat. The patient recently returned from a trip to [facility] approximately 1 week ago and then 3 days ago developed symptoms of a sore throat which progressed into fever, diarrhea, fatigue, and generalized weakness. She is concerned that she could have either strep throat or COVID. She is immunized against COVID-19, including booster. This morning she felt her heart racing and checked her heart rate and it was in the 160s and was concerned that she may have flipped back into atrial fibrillation. She states that the feeling has stopped at this point. She does not complain of shortness of breath and denies any chest pain. She has no abdominal pain and is not reporting nausea or vomiting. Review of Systems     Review of Systems   Constitutional:  Positive for fatigue and fever. Negative for chills. Respiratory:  Positive for cough. Negative for shortness of breath. Cardiovascular:  Negative for chest pain. Gastrointestinal:  Positive for diarrhea. Negative for abdominal pain, nausea and vomiting. Genitourinary:  Negative for dysuria. Neurological:  Positive for weakness. All other systems reviewed and are negative. Past Medical, Surgical, Family, and Social History     She has a past medical history of Atrial fibrillation ([address] Breast cancer ([facility] [address] Neuropathy, and Thyroid disease. She has a past surgical history that includes Tonsillectomy and Cardiac surgery. Her family history is not on file. She reports that she has never smoked.  She has never used smokeless tobacco. She reports that she does not currently use alcohol. She reports current drug use. Drug: Marijuana Kenji Molina). Medications     Current Discharge Medication List        CONTINUE these medications which have NOT CHANGED    Details   flecainide (TAMBOCOR) 100 MG tablet Take 1 tablet by mouth 2 times daily  Qty: 60 tablet, Refills: 3      apixaban (ELIQUIS) 5 MG TABS tablet Take 1 tablet by mouth 2 times daily  Qty: 180 tablet, Refills: 3      levothyroxine (SYNTHROID) 125 MCG tablet Take 1 tablet by mouth Daily  Qty: 90 tablet, Refills: 3      gabapentin (NEURONTIN) 300 MG capsule Take 300 mg by mouth 2 times daily. metoprolol succinate (TOPROL XL) 50 MG extended release tablet Take 2 tablets by mouth daily  Qty: 90 tablet, Refills: 3             Allergies     She has No Known Allergies. Physical Exam     INITIAL VITALS: BP: 101/68, Temp: 98.6 °F (37 °C), Heart Rate: 60, Resp: 18, SpO2: 96 %   Physical Exam  Vitals and nursing note reviewed. Constitutional:       General: She is not in acute distress. Appearance: She is well-developed. She is not diaphoretic. HENT:      Mouth/Throat:      Pharynx: Uvula midline. Posterior oropharyngeal erythema present. No pharyngeal swelling, oropharyngeal exudate or uvula swelling. Eyes:      Conjunctiva/sclera: Conjunctivae normal.      Pupils: Pupils are equal, round, and reactive to light. Cardiovascular:      Rate and Rhythm: Normal rate and regular rhythm. Pulmonary:      Effort: Pulmonary effort is normal.      Breath sounds: Normal breath sounds. No wheezing or rales. Abdominal:      General: There is no distension. Palpations: Abdomen is soft. Tenderness: There is no abdominal tenderness. Skin:     General: Skin is warm and dry. Neurological:      Mental Status: She is alert and oriented to person, place, and time.    Psychiatric:         Behavior: Behavior normal.       Diagnostic Results     EKG   Interpreted by Mickey Mccartney MD     Rhythm: normal sinus   Rate: normal  Axis: normal  Ectopy: none  Conduction: LAHB  ST Segments: no acute change  T Waves: no acute change  Q Waves: none    Clinical Impression: normal sinus rhythm with no acute changes      RADIOLOGY:  XR CHEST PORTABLE   Final Result   1. No acute disease.           LABS:   Results for orders placed or performed during the hospital encounter of 09/12/22   COVID-19 & Influenza Combo    Specimen: Nasopharyngeal Swab   Result Value Ref Range    SARS-CoV-2 RNA, RT PCR DETECTED (A) NOT DETECTED    INFLUENZA A NOT DETECTED NOT DETECTED    INFLUENZA B NOT DETECTED NOT DETECTED   Strep Screen Group A Throat    Specimen: Throat   Result Value Ref Range    Rapid Strep A Screen Negative Negative   CBC with Auto Differential   Result Value Ref Range    WBC 9.1 4.0 - 11.0 K/uL    RBC 5.03 4.00 - 5.20 M/uL    Hemoglobin 15.2 12.0 - 16.0 g/dL    Hematocrit 47.3 36.0 - 48.0 %    MCV 94.1 80.0 - 100.0 fL    MCH 30.3 26.0 - 34.0 pg    MCHC 32.2 31.0 - 36.0 g/dL    RDW 14.1 12.4 - 15.4 %    Platelets 652 461 - 982 K/uL    MPV 9.6 5.0 - 10.5 fL    Neutrophils % 66.8 %    Lymphocytes % 22.3 %    Monocytes % 10.7 %    Eosinophils % 0.0 %    Basophils % 0.2 %    Neutrophils Absolute 6.1 1.7 - 7.7 K/uL    Lymphocytes Absolute 2.0 1.0 - 5.1 K/uL    Monocytes Absolute 1.0 0.0 - 1.3 K/uL    Eosinophils Absolute 0.0 0.0 - 0.6 K/uL    Basophils Absolute 0.0 0.0 - 0.2 K/uL   CMP w/ Reflex to MG   Result Value Ref Range    Sodium 134 (L) 136 - 145 mmol/L    Potassium reflex Magnesium 3.9 3.5 - 5.1 mmol/L    Chloride 99 99 - 110 mmol/L    CO2 24 21 - 32 mmol/L    Anion Gap 11 3 - 16    Glucose 96 70 - 99 mg/dL    BUN 12 7 - 20 mg/dL    Creatinine 0.8 0.6 - 1.2 mg/dL    GFR Non-African American >60 >60    GFR African American >60 >60    Calcium 8.8 8.3 - 10.6 mg/dL    Total Protein 6.5 6.4 - 8.2 g/dL    Albumin 3.9 3.4 - 5.0 g/dL    Albumin/Globulin Ratio 1.5 1.1 - 2.2    Total Bilirubin 0.3 0.0 - 1.0 mg/dL    Alkaline Phosphatase 76 40 - 129 U/L    ALT 20 10 - 40 U/L    AST 28 15 - 37 U/L   EKG 12 Lead   Result Value Ref Range    Ventricular Rate 62 BPM    Atrial Rate 62 BPM    P-R Interval 168 ms    QRS Duration 82 ms    Q-T Interval 394 ms    QTc Calculation (Bazett) 399 ms    P Axis 46 degrees    R Axis -49 degrees    T Axis 57 degrees    Diagnosis       EKG performed in ER and to be interpreted by ER physician. Confirmed by MD, ER ([id number]),  Gurmeet Cabrera ([id number]) on 9/12/2022 1:45:09 PM       ED BEDSIDE ULTRASOUND:  No results found. RECENT VITALS:  BP: 101/68,Temp: 98.6 °F (37 °C), Heart Rate: 60, Resp: 18, SpO2: 96 %     ED Course     Nursing Notes, Past Medical Hx, Past Surgical Hx, Social Hx,Allergies, and Family Hx were reviewed. patient was given the following medications:  Orders Placed This Encounter   Medications    lactated ringers bolus    DISCONTD: dexamethasone (DECADRON) injection 10 mg    dexamethasone (DECADRON) injection 10 mg       CONSULTS:  None    MEDICAL DECISIONMAKING / ASSESSMENT / Wandrtiff Tevin is a 68 y.o. female presenting with generalized weakness and fatigue as well as sore throat and diarrhea which has resolved. Lab evaluation was unremarkable with exception of a positive COVID-19 test.  Influenza and rapid strep's were negative. Chest x-ray was clear. Given that the patient is on Eliquis, Paxlovid is a relative contraindication and given that she is on flecainide, this is a contraindication to starting Paxlovid. At this point the risks associated with placing the patient on this new medication are higher than the benefits based on her medication regimen. The patient is not hypoxic nor is she febrile or tachycardic and as a result I feel that she should just undertake symptom control as best as possible at home and isolate. Clinical Impression     1. COVID-19    2.  Other fatigue        Disposition     DISPOSITION Discharge - Pending Orders Complete 09/12/2022 02:00:57 PM         Dyllan Bolanos Nayeli Kowalski MD  09/12/22 [id number]

## 2022-09-12 NOTE — ED NOTES
Pt able to tolerate PO fluids or food while in the ER. Pt denies N/V.       Samson Santiago RN  09/12/22 [id number]

## 2022-09-12 NOTE — ED NOTES
--Patient provided with discharge instructions and any prescriptions. --Instructions, dosing, and follow-up appointments reviewed with patient/family. No further questions or needs at this time. --Vital signs and patient stable upon discharge. --Patient ambulatory to [facility].        Samson Santiago RN  09/12/22 [id number]

## 2022-09-12 NOTE — ED NOTES
Pt arrives from home from home for feeling weak over the past couple of days and concern for COVID/strep. Pt states she just returned form recent trip to [facility]. Pt states she has had a fever, fatigue, sore throat and some diarrhea. Pt states hx of afib and that her HR was 160 at home. Pt alert and oriented and VSS upon arrival, see flowsheets.       Maricruz Valerio RN  09/12/22 [id number]

## 2022-09-12 NOTE — ED NOTES
Pt ambulatory with steady gait and without use of assistive devices. Pt denies any lightheaded or dizziness.       Jack Blackmon RN  09/12/22 [id number]

## 2022-09-14 LAB — S PYO THROAT QL CULT: NORMAL

## 2022-09-15 ENCOUNTER — HOSPITAL ENCOUNTER (INPATIENT)
Age: 77
LOS: 3 days | Discharge: HOME | DRG: 308 | End: 2022-09-18
Attending: EMERGENCY MEDICINE | Admitting: INTERNAL MEDICINE
Payer: MEDICARE

## 2022-09-15 ENCOUNTER — APPOINTMENT (OUTPATIENT)
Dept: GENERAL RADIOLOGY | Age: 77
DRG: 308 | End: 2022-09-15
Payer: MEDICARE

## 2022-09-15 DIAGNOSIS — R55 NEAR SYNCOPE: ICD-10-CM

## 2022-09-15 DIAGNOSIS — I48.91 ATRIAL FIBRILLATION WITH RVR (HCC): ICD-10-CM

## 2022-09-15 DIAGNOSIS — U07.1 COVID: Primary | ICD-10-CM

## 2022-09-15 LAB
ANION GAP SERPL CALCULATED.3IONS-SCNC: 13 MMOL/L (ref 3–16)
BASOPHILS ABSOLUTE: 0 K/UL (ref 0–0.2)
BASOPHILS RELATIVE PERCENT: 0.3 %
BUN BLDV-MCNC: 16 MG/DL (ref 7–20)
CALCIUM SERPL-MCNC: 9.2 MG/DL (ref 8.3–10.6)
CHLORIDE BLD-SCNC: 102 MMOL/L (ref 99–110)
CO2: 21 MMOL/L (ref 21–32)
CREAT SERPL-MCNC: 0.9 MG/DL (ref 0.6–1.2)
EKG ATRIAL RATE: 326 BPM
EKG DIAGNOSIS: NORMAL
EKG Q-T INTERVAL: 354 MS
EKG QRS DURATION: 82 MS
EKG QTC CALCULATION (BAZETT): 459 MS
EKG R AXIS: -7 DEGREES
EKG T AXIS: 67 DEGREES
EKG VENTRICULAR RATE: 101 BPM
EOSINOPHILS ABSOLUTE: 0 K/UL (ref 0–0.6)
EOSINOPHILS RELATIVE PERCENT: 0.1 %
GFR AFRICAN AMERICAN: >60
GFR NON-AFRICAN AMERICAN: >60
GLUCOSE BLD-MCNC: 167 MG/DL (ref 70–99)
HCT VFR BLD CALC: 45.9 % (ref 36–48)
HEMOGLOBIN: 15.5 G/DL (ref 12–16)
LYMPHOCYTES ABSOLUTE: 3.3 K/UL (ref 1–5.1)
LYMPHOCYTES RELATIVE PERCENT: 45.1 %
MAGNESIUM: 1.9 MG/DL (ref 1.8–2.4)
MCH RBC QN AUTO: 30.8 PG (ref 26–34)
MCHC RBC AUTO-ENTMCNC: 33.8 G/DL (ref 31–36)
MCV RBC AUTO: 91.1 FL (ref 80–100)
MONOCYTES ABSOLUTE: 0.7 K/UL (ref 0–1.3)
MONOCYTES RELATIVE PERCENT: 9 %
NEUTROPHILS ABSOLUTE: 3.3 K/UL (ref 1.7–7.7)
NEUTROPHILS RELATIVE PERCENT: 45.5 %
PDW BLD-RTO: 14.1 % (ref 12.4–15.4)
PLATELET # BLD: 176 K/UL (ref 135–450)
PMV BLD AUTO: 9.6 FL (ref 5–10.5)
POTASSIUM REFLEX MAGNESIUM: 3.6 MMOL/L (ref 3.5–5.1)
PRO-BNP: 2802 PG/ML (ref 0–449)
RBC # BLD: 5.04 M/UL (ref 4–5.2)
SODIUM BLD-SCNC: 136 MMOL/L (ref 136–145)
TROPONIN: <0.01 NG/ML
WBC # BLD: 7.4 K/UL (ref 4–11)

## 2022-09-15 PROCEDURE — 6370000000 HC RX 637 (ALT 250 FOR IP): Performed by: INTERNAL MEDICINE

## 2022-09-15 PROCEDURE — 96360 HYDRATION IV INFUSION INIT: CPT

## 2022-09-15 PROCEDURE — 83880 ASSAY OF NATRIURETIC PEPTIDE: CPT

## 2022-09-15 PROCEDURE — 2580000003 HC RX 258: Performed by: PHYSICIAN ASSISTANT

## 2022-09-15 PROCEDURE — 1200000000 HC SEMI PRIVATE

## 2022-09-15 PROCEDURE — 80048 BASIC METABOLIC PNL TOTAL CA: CPT

## 2022-09-15 PROCEDURE — 71045 X-RAY EXAM CHEST 1 VIEW: CPT

## 2022-09-15 PROCEDURE — 84484 ASSAY OF TROPONIN QUANT: CPT

## 2022-09-15 PROCEDURE — 96361 HYDRATE IV INFUSION ADD-ON: CPT

## 2022-09-15 PROCEDURE — 83735 ASSAY OF MAGNESIUM: CPT

## 2022-09-15 PROCEDURE — 99285 EMERGENCY DEPT VISIT HI MDM: CPT

## 2022-09-15 PROCEDURE — 2580000003 HC RX 258: Performed by: INTERNAL MEDICINE

## 2022-09-15 PROCEDURE — 93005 ELECTROCARDIOGRAM TRACING: CPT | Performed by: EMERGENCY MEDICINE

## 2022-09-15 PROCEDURE — 85025 COMPLETE CBC W/AUTO DIFF WBC: CPT

## 2022-09-15 RX ORDER — GABAPENTIN 300 MG/1
600 CAPSULE ORAL 2 TIMES DAILY
Status: DISCONTINUED | OUTPATIENT
Start: 2022-09-16 | End: 2022-09-15

## 2022-09-15 RX ORDER — ACETAMINOPHEN 650 MG/1
650 SUPPOSITORY RECTAL EVERY 6 HOURS PRN
Status: DISCONTINUED | OUTPATIENT
Start: 2022-09-15 | End: 2022-09-18 | Stop reason: HOSPADM

## 2022-09-15 RX ORDER — SODIUM CHLORIDE 9 MG/ML
INJECTION, SOLUTION INTRAVENOUS PRN
Status: DISCONTINUED | OUTPATIENT
Start: 2022-09-15 | End: 2022-09-18 | Stop reason: HOSPADM

## 2022-09-15 RX ORDER — GABAPENTIN 300 MG/1
600 CAPSULE ORAL 2 TIMES DAILY
Status: DISCONTINUED | OUTPATIENT
Start: 2022-09-15 | End: 2022-09-18 | Stop reason: HOSPADM

## 2022-09-15 RX ORDER — FLECAINIDE ACETATE 100 MG/1
100 TABLET ORAL 2 TIMES DAILY
Status: DISCONTINUED | OUTPATIENT
Start: 2022-09-15 | End: 2022-09-16

## 2022-09-15 RX ORDER — POTASSIUM CHLORIDE 20 MEQ/1
40 TABLET, EXTENDED RELEASE ORAL ONCE
Status: COMPLETED | OUTPATIENT
Start: 2022-09-15 | End: 2022-09-15

## 2022-09-15 RX ORDER — HYDROCODONE BITARTRATE AND ACETAMINOPHEN 5; 325 MG/1; MG/1
1 TABLET ORAL EVERY 6 HOURS PRN
Status: DISCONTINUED | OUTPATIENT
Start: 2022-09-15 | End: 2022-09-18 | Stop reason: HOSPADM

## 2022-09-15 RX ORDER — LEVOTHYROXINE SODIUM 0.12 MG/1
125 TABLET ORAL DAILY
Status: DISCONTINUED | OUTPATIENT
Start: 2022-09-16 | End: 2022-09-18 | Stop reason: HOSPADM

## 2022-09-15 RX ORDER — HYDRALAZINE HYDROCHLORIDE 20 MG/ML
10 INJECTION INTRAMUSCULAR; INTRAVENOUS EVERY 6 HOURS PRN
Status: DISCONTINUED | OUTPATIENT
Start: 2022-09-15 | End: 2022-09-18 | Stop reason: HOSPADM

## 2022-09-15 RX ORDER — ACETAMINOPHEN 325 MG/1
650 TABLET ORAL EVERY 6 HOURS PRN
Status: DISCONTINUED | OUTPATIENT
Start: 2022-09-15 | End: 2022-09-18 | Stop reason: HOSPADM

## 2022-09-15 RX ORDER — SODIUM CHLORIDE 0.9 % (FLUSH) 0.9 %
5-40 SYRINGE (ML) INJECTION EVERY 12 HOURS SCHEDULED
Status: DISCONTINUED | OUTPATIENT
Start: 2022-09-15 | End: 2022-09-18 | Stop reason: HOSPADM

## 2022-09-15 RX ORDER — METOPROLOL TARTRATE 50 MG/1
50 TABLET, FILM COATED ORAL 2 TIMES DAILY
Status: DISCONTINUED | OUTPATIENT
Start: 2022-09-16 | End: 2022-09-16

## 2022-09-15 RX ORDER — ONDANSETRON 2 MG/ML
4 INJECTION INTRAMUSCULAR; INTRAVENOUS EVERY 6 HOURS PRN
Status: DISCONTINUED | OUTPATIENT
Start: 2022-09-15 | End: 2022-09-18 | Stop reason: HOSPADM

## 2022-09-15 RX ORDER — DIPHENOXYLATE HCL/ATROPINE 2.5-.025/5
5 LIQUID (ML) ORAL 4 TIMES DAILY PRN
Status: DISCONTINUED | OUTPATIENT
Start: 2022-09-15 | End: 2022-09-18 | Stop reason: HOSPADM

## 2022-09-15 RX ORDER — PANTOPRAZOLE SODIUM 40 MG/1
40 TABLET, DELAYED RELEASE ORAL DAILY
COMMUNITY
Start: 2022-07-22 | End: 2022-11-03 | Stop reason: CLARIF

## 2022-09-15 RX ORDER — 0.9 % SODIUM CHLORIDE 0.9 %
1000 INTRAVENOUS SOLUTION INTRAVENOUS ONCE
Status: COMPLETED | OUTPATIENT
Start: 2022-09-15 | End: 2022-09-15

## 2022-09-15 RX ORDER — GABAPENTIN 300 MG/1
300 CAPSULE ORAL 2 TIMES DAILY
Status: DISCONTINUED | OUTPATIENT
Start: 2022-09-15 | End: 2022-09-15

## 2022-09-15 RX ORDER — ONDANSETRON 4 MG/1
4 TABLET, ORALLY DISINTEGRATING ORAL EVERY 8 HOURS PRN
Status: DISCONTINUED | OUTPATIENT
Start: 2022-09-15 | End: 2022-09-18 | Stop reason: HOSPADM

## 2022-09-15 RX ORDER — SODIUM CHLORIDE 0.9 % (FLUSH) 0.9 %
5-40 SYRINGE (ML) INJECTION PRN
Status: DISCONTINUED | OUTPATIENT
Start: 2022-09-15 | End: 2022-09-18 | Stop reason: HOSPADM

## 2022-09-15 RX ORDER — POLYETHYLENE GLYCOL 3350 17 G/17G
17 POWDER, FOR SOLUTION ORAL DAILY PRN
Status: DISCONTINUED | OUTPATIENT
Start: 2022-09-15 | End: 2022-09-18 | Stop reason: HOSPADM

## 2022-09-15 RX ORDER — LANOLIN ALCOHOL/MO/W.PET/CERES
3 CREAM (GRAM) TOPICAL NIGHTLY PRN
Status: DISCONTINUED | OUTPATIENT
Start: 2022-09-15 | End: 2022-09-18 | Stop reason: HOSPADM

## 2022-09-15 RX ADMIN — APIXABAN 5 MG: 5 TABLET, FILM COATED ORAL at 22:04

## 2022-09-15 RX ADMIN — HYDROCODONE BITARTRATE AND ACETAMINOPHEN 1 TABLET: 5; 325 TABLET ORAL at 20:02

## 2022-09-15 RX ADMIN — POTASSIUM CHLORIDE 40 MEQ: 1500 TABLET, EXTENDED RELEASE ORAL at 20:02

## 2022-09-15 RX ADMIN — SODIUM CHLORIDE, PRESERVATIVE FREE 10 ML: 5 INJECTION INTRAVENOUS at 22:05

## 2022-09-15 RX ADMIN — SODIUM CHLORIDE 1000 ML: 9 INJECTION, SOLUTION INTRAVENOUS at 16:14

## 2022-09-15 RX ADMIN — GABAPENTIN 600 MG: 300 CAPSULE ORAL at 23:37

## 2022-09-15 ASSESSMENT — ENCOUNTER SYMPTOMS
COUGH: 0
NAUSEA: 0
DIARRHEA: 1
ABDOMINAL PAIN: 0
SHORTNESS OF BREATH: 0
VOMITING: 0

## 2022-09-15 ASSESSMENT — PAIN DESCRIPTION - LOCATION: LOCATION: GENERALIZED

## 2022-09-15 ASSESSMENT — PAIN - FUNCTIONAL ASSESSMENT: PAIN_FUNCTIONAL_ASSESSMENT: NONE - DENIES PAIN

## 2022-09-15 ASSESSMENT — PAIN SCALES - GENERAL
PAINLEVEL_OUTOF10: 0
PAINLEVEL_OUTOF10: 0
PAINLEVEL_OUTOF10: 4

## 2022-09-15 ASSESSMENT — PAIN DESCRIPTION - DESCRIPTORS: DESCRIPTORS: ACHING

## 2022-09-15 NOTE — ED NOTES
Attempted to call report without success, number provided for return call.       Lio James RN  09/15/22 1935

## 2022-09-15 NOTE — ED PROVIDER NOTES
Lauro Restrepo ENCOUNTER          PHYSICIAN ASSISTANT NOTE     Date of evaluation: 9/15/2022    ADDENDUM:      [facility] The patient was seen for Dizziness (Dizzy/lightheaded since Saturday. Was diagnosed with covid Monday and sent home. States she has afib and having covid with it is causing her to feel lightheaded and \"spun out\")  . The patient's initial evaluation and plan have been discussed with the prior provider who initially evaluated the patient. Nursing Notes, Past Medical Hx, Past Surgical Hx, Social Hx, Allergies, and Family Hx were all reviewed. Diagnostic Results     RADIOLOGY:  XR CHEST PORTABLE   Final Result   1. Stable chest   2.  No active airspace disease          LABS:   Results for orders placed or performed during the hospital encounter of 09/15/22   BMP w/ Reflex to MG   Result Value Ref Range    Sodium 136 136 - 145 mmol/L    Potassium reflex Magnesium 3.6 3.5 - 5.1 mmol/L    Chloride 102 99 - 110 mmol/L    CO2 21 21 - 32 mmol/L    Anion Gap 13 3 - 16    Glucose 167 (H) 70 - 99 mg/dL    BUN 16 7 - 20 mg/dL    Creatinine 0.9 0.6 - 1.2 mg/dL    GFR Non-African American >60 >60    GFR African American >60 >60    Calcium 9.2 8.3 - 10.6 mg/dL   CBC with Auto Differential   Result Value Ref Range    WBC 7.4 4.0 - 11.0 K/uL    RBC 5.04 4.00 - 5.20 M/uL    Hemoglobin 15.5 12.0 - 16.0 g/dL    Hematocrit 45.9 36.0 - 48.0 %    MCV 91.1 80.0 - 100.0 fL    MCH 30.8 26.0 - 34.0 pg    MCHC 33.8 31.0 - 36.0 g/dL    RDW 14.1 12.4 - 15.4 %    Platelets 204 758 - 599 K/uL    MPV 9.6 5.0 - 10.5 fL    Neutrophils % 45.5 %    Lymphocytes % 45.1 %    Monocytes % 9.0 %    Eosinophils % 0.1 %    Basophils % 0.3 %    Neutrophils Absolute 3.3 1.7 - 7.7 K/uL    Lymphocytes Absolute 3.3 1.0 - 5.1 K/uL    Monocytes Absolute 0.7 0.0 - 1.3 K/uL    Eosinophils Absolute 0.0 0.0 - 0.6 K/uL    Basophils Absolute 0.0 0.0 - 0.2 K/uL   Troponin Result Value Ref Range    Troponin <0.01 <0.01 ng/mL   Brain Natriuretic Peptide   Result Value Ref Range    Pro-BNP 2,802 (H) 0 - 449 pg/mL   Magnesium   Result Value Ref Range    Magnesium 1.90 1.80 - 2.40 mg/dL   EKG 12 Lead   Result Value Ref Range    Ventricular Rate 101 BPM    Atrial Rate 326 BPM    QRS Duration 82 ms    Q-T Interval 354 ms    QTc Calculation (Bazett) 459 ms    R Axis -7 degrees    T Axis 67 degrees    Diagnosis       EKG performed in ER and to be interpreted by ER physician. Confirmed by MD, ER ([id number]),  Tony Laboy ([id number]) on 9/15/2022 4:25:43 PM       RECENT VITALS:  BP: 113/80, Temp: 97.8 °F (36.6 °C), Heart Rate: 80, Resp: 18, SpO2: 98 %     Procedures     N/a    ED Course          The patient was given the following medications:  Orders Placed This Encounter   Medications    0.9 % sodium chloride bolus    apixaban (ELIQUIS) tablet 5 mg     Order Specific Question:   Indication of Use     Answer:   A Fib/A Flutter    flecainide (TAMBOCOR) tablet 100 mg    DISCONTD: gabapentin (NEURONTIN) capsule 300 mg    levothyroxine (SYNTHROID) tablet 125 mcg    metoprolol tartrate (LOPRESSOR) tablet 50 mg    sodium chloride flush 0.9 % injection 5-40 mL    sodium chloride flush 0.9 % injection 5-40 mL    0.9 % sodium chloride infusion    OR Linked Order Group     ondansetron (ZOFRAN-ODT) disintegrating tablet 4 mg     ondansetron (ZOFRAN) injection 4 mg    polyethylene glycol (GLYCOLAX) packet 17 g    OR Linked Order Group     acetaminophen (TYLENOL) tablet 650 mg     acetaminophen (TYLENOL) suppository 650 mg    hydrALAZINE (APRESOLINE) injection 10 mg    melatonin tablet 3 mg    diphenoxylate-atropine (LOMOTIL) liquid 5 mL    potassium chloride (KLOR-CON M) extended release tablet 40 mEq    HYDROcodone-acetaminophen (NORCO) 5-325 MG per tablet 1 tablet    DISCONTD: gabapentin (NEURONTIN) capsule 600 mg    gabapentin (NEURONTIN) capsule 600 mg       CONSULTS:  IP CONSULT TO HOSPITALIST    MEDICAL DECISION MAKING / ASSESSMENT / Alfonzo Dolan is a 68 y.o. female who was recently diagnosed with COVID. Patient presents today feeling extra weak at home. She has a history of A. fib, paroxysmal and had an ablation done in late July. She has been in A. fib since this time and is still on Eliquis. Patient felt herself go into A. fib earlier today, worsening her weakness. She is concerned since she lives home by herself. Patient's initial heart rate at home was 130. For treatment of this she took 2 of her home metoprolol, now presents hypotensive. Patient is concerned that she is too weak to stay at home by herself in the setting of her COVID. PENDING: Disposition  AFTER TURNOVER: When I went to reassess the patient she was still hypotensive after 1 L of fluid with a systolic of 99 and a heart rate in the 110s. Given patient's persistent hypotension, A. fib with RVR and weakness in the setting of COVID I do feel that admission to the hospital is the best and safest plan. Patient is agreeable to and feels more comfortable with this plan as well. This patient was also evaluated by the attending physician. All care plans were discussed and agreed upon. Clinical Impression     1. COVID    2. Near syncope    3.  Atrial fibrillation with RVR [facility])      Disposition     DISPOSITION Admitted 09/15/2022 07:07:32 PM         Fabiola Quinteros, [address]  09/16/22 1968

## 2022-09-15 NOTE — ED PROVIDER NOTES
[address] [facility] [address] ENCOUNTER          PHYSICIAN ASSISTANT NOTE       Date of evaluation: 9/15/2022    Chief Complaint     Dizziness (Dizzy/lightheaded since Saturday. Was diagnosed with covid Monday and sent home. States she has afib and having covid with it is causing her to feel lightheaded and \"spun out\")      History of Present Illness     Teri Calderón is a 68 y.o. female with a history of atrial fibrillation on Eliquis with cardiac ablation 2 months ago, thyroid disease, who was here 3 days ago and diagnosed with COVID-19 after presenting with sore throat, fever, diarrhea, fatigue and generalized weakness. BMP, CBC, were unremarkable; strep screen was negative. She returns today reporting that she now believes she is in atrial fibrillation after waking up this morning and feeling lightheaded when she tried to stand up. She felt that she was in atrial fibrillation and took an additional dose of her metoprolol. She called her cardiologist who advised her to come to the emergency department. On arrival blood pressure is 90/65. All other vital signs are within normal limits. She endorses symptoms continuing that were preceding her prior visit including generalized body aches, fatigue, nonbloody diarrhea in addition to increasing generalized weakness today and lightheadedness mentioned above. She denies chest pain, abdominal pain, urinary symptoms. Review of Systems     Review of Systems   Constitutional:  Positive for chills and fever. Respiratory:  Negative for cough and shortness of breath. Cardiovascular:  Negative for chest pain. Gastrointestinal:  Positive for diarrhea. Negative for abdominal pain, nausea and vomiting. Genitourinary:  Negative for dysuria. Neurological:  Positive for weakness and light-headedness. Negative for headaches.      Past Medical, Surgical, Family, and Social History     She has a past medical history of Atrial fibrillation ([facility] [address] Breast cancer ([address] Neuropathy, and Thyroid disease. She has a past surgical history that includes Tonsillectomy and Cardiac surgery. Her family history is not on file. She reports that she has never smoked. She has never used smokeless tobacco. She reports that she does not currently use alcohol. She reports that she does not currently use drugs after having used the following drugs: Marijuana Darryle Pimple). Medications     Previous Medications    APIXABAN (ELIQUIS) 5 MG TABS TABLET    Take 1 tablet by mouth 2 times daily    FLECAINIDE (TAMBOCOR) 100 MG TABLET    Take 1 tablet by mouth 2 times daily    GABAPENTIN (NEURONTIN) 300 MG CAPSULE    Take 300 mg by mouth 2 times daily. LEVOTHYROXINE (SYNTHROID) 125 MCG TABLET    Take 1 tablet by mouth Daily    METOPROLOL SUCCINATE (TOPROL XL) 50 MG EXTENDED RELEASE TABLET    Take 2 tablets by mouth daily       Allergies     She has No Known Allergies. Physical Exam     INITIAL VITALS: BP: 102/67, Temp: 97.4 °F (36.3 °C), Heart Rate: 91, Resp: 18, SpO2: 95 %  Physical Exam  Constitutional:       Appearance: Normal appearance. HENT:      Head: Normocephalic and atraumatic. Cardiovascular:      Rate and Rhythm: Normal rate and regular rhythm. Pulses: Normal pulses. Heart sounds: Normal heart sounds. No murmur heard. No friction rub. Pulmonary:      Effort: Pulmonary effort is normal.   Musculoskeletal:         General: Normal range of motion. Cervical back: Normal range of motion. Skin:     General: Skin is warm and dry. Neurological:      Mental Status: She is alert and oriented to person, place, and time. Psychiatric:         Mood and Affect: Mood normal.         Behavior: Behavior normal.       Diagnostic Results     EKG   EKG shows atrial fibrillation at a rate of 112 bpm.  No ST elevation or depression present. SC interval 208. QRS 82. .     RADIOLOGY:  XR CHEST PORTABLE    (Results Pending)       LABS:   No results found for this visit on 09/15/22. ED BEDSIDE ULTRASOUND:  No results found. RECENT VITALS:  BP: 90/65, Temp: 97.4 °F (36.3 °C), Heart Rate: 91, Resp: 19, SpO2: 95 %     Procedures         ED Course     Nursing Notes, Past Medical Hx,Past Surgical Hx, Social Hx, Allergies, and Family Hx were reviewed. The patient was given the following medications:  Orders Placed This Encounter   Medications    0.9 % sodium chloride bolus       CONSULTS:  None    MEDICAL DECISION MAKING / ASSESSMENT / Rut Ursula is a 68 y.o. female with a history of atrial fibrillation on Eliquis, thyroid disease, who was here 3 days ago and diagnosed with COVID-19 after presenting with sore throat, fever, diarrhea, fatigue and generalized weakness. BMP, CBC, were unremarkable; strep screen was negative. She returns today reporting that she now believes she is in atrial fibrillation after waking up this morning and feeling lightheaded when she tried to stand up. She felt that she was in atrial fibrillation and took an additional dose of her metoprolol. She called her cardiologist who advised her to come to the emergency department. On arrival blood pressure is 90/65. All other vital signs are within normal limits. She endorses symptoms continuing that were preceding her prior visit including generalized body aches, fatigue, nonbloody diarrhea in addition to increasing generalized weakness today and lightheadedness mentioned above. She denies chest pain, abdominal pain, urinary symptoms. She is alert and oriented x4. Blood pressure is 90/65. Heart rate is 91. All other vital signs are within normal limits. On exam she is well-appearing. Lung sounds are clear to auscultation bilaterally. BNP is 2802 up from 2100 a year ago; BMP, CBC are unremarkable; troponin is negative      EKG shows atrial fibrillation at a rate of 112 bpm.  No ST elevation or depression present. VT interval 208. QRS 82. .       CXR was pending at the time of 1 of service      At this time I am going off service will be turning over care of this patient to my colleague Edin Ireland PA-C for continued care and evaluation. Steps remaining in care of this patient at the time I went off service included:    1. CXR  2. Disposition    This patient was also evaluated by the attending physician. All care plans were discussed and agreed upon. Clinical Impression     1. Lightheadedness        Disposition     PATIENT REFERRED TO:  No follow-up provider specified.     DISCHARGE MEDICATIONS:  New Prescriptions    No medications on file       [address] [facility]  09/15/22 [id number]

## 2022-09-15 NOTE — ED PROVIDER NOTES
ED Attending Attestation Note     Date of evaluation: 9/15/2022    This patient was seen by the advance practice provider. I have seen and examined the patient, agree with the workup, evaluation, management and diagnosis. The care plan has been discussed. I have reviewed the ECG and concur with the [facility]'s interpretation. My assessment reveals a somewhat frail-appearing patient, in no acute distress. She presents tonight with a 5-day history of feeling weak, woozy, and lightheaded, with generalized body aches, fatigue, and diarrhea.  3 days ago she was seen in this emergency department and diagnosed with COVID-19 as the source of symptoms. She is still generalized constitutional symptoms of body aches, fatigue, and lightheadedness, but earlier today she felt a sense of palpitations and worsening lightheadedness, feeling that she might pass out, and was concerned that she was in atrial fibrillation, so she took an extra dose of her home metoprolol, and called her cardiologist.  Due to her extra dose of beta-blocker and worsening lightheadedness, she was referred to the emergency department. She has no focal neurodeficits, no chest pain, no shortness of breath, but is in atrial fibrillation on her EKG with a rate in the 110s. She continues to have soft pressures after fluid administration, and feels weak and lightheaded when she gets up, therefore she is admitted for further evaluation and management.        Yung Mead MD  09/21/22 2044

## 2022-09-16 LAB
ANION GAP SERPL CALCULATED.3IONS-SCNC: 11 MMOL/L (ref 3–16)
BASOPHILS ABSOLUTE: 0 K/UL (ref 0–0.2)
BASOPHILS RELATIVE PERCENT: 0.1 %
BUN BLDV-MCNC: 14 MG/DL (ref 7–20)
CALCIUM SERPL-MCNC: 8.6 MG/DL (ref 8.3–10.6)
CHLORIDE BLD-SCNC: 106 MMOL/L (ref 99–110)
CO2: 22 MMOL/L (ref 21–32)
CREAT SERPL-MCNC: 0.6 MG/DL (ref 0.6–1.2)
EOSINOPHILS ABSOLUTE: 0 K/UL (ref 0–0.6)
EOSINOPHILS RELATIVE PERCENT: 0.1 %
GFR AFRICAN AMERICAN: >60
GFR NON-AFRICAN AMERICAN: >60
GLUCOSE BLD-MCNC: 101 MG/DL (ref 70–99)
HCT VFR BLD CALC: 43 % (ref 36–48)
HEMOGLOBIN: 14.4 G/DL (ref 12–16)
LYMPHOCYTES ABSOLUTE: 2.1 K/UL (ref 1–5.1)
LYMPHOCYTES RELATIVE PERCENT: 48.3 %
MCH RBC QN AUTO: 30.8 PG (ref 26–34)
MCHC RBC AUTO-ENTMCNC: 33.5 G/DL (ref 31–36)
MCV RBC AUTO: 91.9 FL (ref 80–100)
MONOCYTES ABSOLUTE: 0.4 K/UL (ref 0–1.3)
MONOCYTES RELATIVE PERCENT: 9.8 %
NEUTROPHILS ABSOLUTE: 1.8 K/UL (ref 1.7–7.7)
NEUTROPHILS RELATIVE PERCENT: 41.7 %
PDW BLD-RTO: 14.1 % (ref 12.4–15.4)
PLATELET # BLD: 140 K/UL (ref 135–450)
PMV BLD AUTO: 9.5 FL (ref 5–10.5)
POTASSIUM REFLEX MAGNESIUM: 4 MMOL/L (ref 3.5–5.1)
RBC # BLD: 4.68 M/UL (ref 4–5.2)
SODIUM BLD-SCNC: 139 MMOL/L (ref 136–145)
WBC # BLD: 4.2 K/UL (ref 4–11)

## 2022-09-16 PROCEDURE — 80048 BASIC METABOLIC PNL TOTAL CA: CPT

## 2022-09-16 PROCEDURE — 6370000000 HC RX 637 (ALT 250 FOR IP): Performed by: INTERNAL MEDICINE

## 2022-09-16 PROCEDURE — 97166 OT EVAL MOD COMPLEX 45 MIN: CPT

## 2022-09-16 PROCEDURE — 84443 ASSAY THYROID STIM HORMONE: CPT

## 2022-09-16 PROCEDURE — 84439 ASSAY OF FREE THYROXINE: CPT

## 2022-09-16 PROCEDURE — 2580000003 HC RX 258: Performed by: INTERNAL MEDICINE

## 2022-09-16 PROCEDURE — 97530 THERAPEUTIC ACTIVITIES: CPT

## 2022-09-16 PROCEDURE — 85025 COMPLETE CBC W/AUTO DIFF WBC: CPT

## 2022-09-16 PROCEDURE — 36415 COLL VENOUS BLD VENIPUNCTURE: CPT

## 2022-09-16 PROCEDURE — 97535 SELF CARE MNGMENT TRAINING: CPT

## 2022-09-16 PROCEDURE — 97116 GAIT TRAINING THERAPY: CPT

## 2022-09-16 PROCEDURE — 1200000000 HC SEMI PRIVATE

## 2022-09-16 PROCEDURE — 97162 PT EVAL MOD COMPLEX 30 MIN: CPT

## 2022-09-16 RX ORDER — SODIUM CHLORIDE 9 MG/ML
INJECTION, SOLUTION INTRAVENOUS CONTINUOUS
Status: ACTIVE | OUTPATIENT
Start: 2022-09-16 | End: 2022-09-16

## 2022-09-16 RX ADMIN — APIXABAN 5 MG: 5 TABLET, FILM COATED ORAL at 20:43

## 2022-09-16 RX ADMIN — SODIUM CHLORIDE: 9 INJECTION, SOLUTION INTRAVENOUS at 15:35

## 2022-09-16 RX ADMIN — GABAPENTIN 600 MG: 300 CAPSULE ORAL at 20:43

## 2022-09-16 RX ADMIN — SODIUM CHLORIDE, PRESERVATIVE FREE 10 ML: 5 INJECTION INTRAVENOUS at 15:37

## 2022-09-16 RX ADMIN — GABAPENTIN 600 MG: 300 CAPSULE ORAL at 10:05

## 2022-09-16 RX ADMIN — LEVOTHYROXINE SODIUM 125 MCG: 0.12 TABLET ORAL at 05:58

## 2022-09-16 RX ADMIN — APIXABAN 5 MG: 5 TABLET, FILM COATED ORAL at 10:05

## 2022-09-16 RX ADMIN — METOPROLOL TARTRATE 25 MG: 25 TABLET, FILM COATED ORAL at 20:43

## 2022-09-16 ASSESSMENT — PAIN DESCRIPTION - DESCRIPTORS: DESCRIPTORS: ACHING

## 2022-09-16 ASSESSMENT — PAIN SCALES - GENERAL
PAINLEVEL_OUTOF10: 0
PAINLEVEL_OUTOF10: 2
PAINLEVEL_OUTOF10: 0

## 2022-09-16 ASSESSMENT — PAIN DESCRIPTION - LOCATION: LOCATION: GENERALIZED

## 2022-09-16 NOTE — PROGRESS NOTES
Physician Progress Note      Bailee Domingo  CSN #:                  [id number]  :                       1945  ADMIT DATE:       9/15/2022 3:22 PM  [address] [place] DATE:  RESPONDING  PROVIDER #:        Gertrudis Gutierrez MD          QUERY TEXT:    Patient admitted with Covid-19, noted to have atrial fibrillation and is   maintained on Eliquis. If possible, please document in progress notes and   discharge summary if you are evaluating and/or treating any of the following: The medical record reflects the following:  Risk Factors: 68 y.o. female with Covid-19, A-fib  Clinical Indicators: A-fib being treated with Eliquis  Treatment: Eliquis  Options provided:  -- Secondary hypercoagulable state in a patient with atrial fibrillation  -- Other - I will add my own diagnosis  -- Disagree - Not applicable / Not valid  -- Disagree - Clinically unable to determine / Unknown  -- Refer to Clinical Documentation Reviewer    PROVIDER RESPONSE TEXT:    This patient has secondary hypercoagulable state related to atrial   fibrillation.     Query created by: Alfreda Miner on 2022 1:23 PM      Electronically signed by:  Gertrudis Gutierrez MD 2022 6:43 PM

## 2022-09-16 NOTE — PROGRESS NOTES
Orthostatic Vitals:  Orthostatic Vitals 9/15/2022   Orthostatic B/P and Pulse?  Yes   Blood Pressure Lying 112/72   Pulse Lying 95   Blood Pressure Sitting 102/53   Pulse Sitting 90   Blood Pressure Standing 85/49   Pulse Standing 107

## 2022-09-16 NOTE — PROGRESS NOTES
09/16/22 1445   Encounter Summary   Encounter Overview/Reason  Advance Care Planning   Service Provided For: Patient   Referral/Consult From: Other (comment)  (routine covid)   Support System Spouse   Last Encounter    (es 9/16)   Complexity of Encounter High   Begin Time 1425   End Time  1447   Total Time Calculated 22 min   Advance Care Planning   Type ACP conversation   Assessment/Intervention/Outcome   Assessment Calm;Coping   Intervention Active listening   Outcome Coping;Engaged in conversation;Receptive; Other (comment)  (ACP)   Plan and Referrals   Plan/Referrals No future visits requested

## 2022-09-16 NOTE — PROGRESS NOTES
Comprehensive Nutrition Assessment    Type and Reason for Visit:  Initial, Positive Nutrition Screen    Nutrition Recommendations/Plan:   Regular diet   Will trial Ensure BID     Malnutrition Assessment:  Malnutrition Status:  Insufficient data (09/16/22 1333)      Nutrition Assessment:    + Screen MST 2. Pt admitted with  afib with RVR. Also with Covid-19, in isolation. Attempted to call, no answer. On Regular diet, no intakes recorded. Pt with limited weights available for review. Will trial supplement. Nutrition Related Findings:    trace edema to BLE Wound Type: None       Current Nutrition Intake & Therapies:    Average Meal Intake: Unable to assess  Average Supplements Intake: Unable to assess  ADULT DIET; Regular    Anthropometric Measures:  Height: 5' 6\" (167.6 cm)  Ideal Body Weight (IBW): 130 lbs (59 kg)       Current Body Weight: 151 lb (68.5 kg), 116.2 % IBW.     Current BMI (kg/m2): 24.4                          BMI Categories: Normal Weight (BMI 22.0 to 24.9) age over 72    Estimated Daily Nutrient Needs:  Energy Requirements Based On: Kcal/kg  Weight Used for Energy Requirements: Current  Energy (kcal/day): 8584-9314 kcal (25-30 kcal/kg ABW)  Weight Used for Protein Requirements: Current  Protein (g/day): 69-83 gm (1-1.2 gm/kg ABW)  Method Used for Fluid Requirements: 1 ml/kcal  Fluid (ml/day):      Nutrition Diagnosis:   Inadequate oral intake related to increase demand for energy/nutrients as evidenced by poor intake prior to admission    Nutrition Interventions:   Food and/or Nutrient Delivery: Continue Current Diet, Start Oral Nutrition Supplement  Nutrition Education/Counseling: No recommendation at this time  Coordination of Nutrition Care: Continue to monitor while inpatient       Goals:     Goals: Meet at least 75% of estimated needs       Nutrition Monitoring and Evaluation:   Behavioral-Environmental Outcomes: None Identified  Food/Nutrient Intake Outcomes: Food and Nutrient Intake, Supplement Intake  Physical Signs/Symptoms Outcomes: Biochemical Data, Nutrition Focused Physical Findings, Skin, Weight    Discharge Planning:    No discharge needs at this time     Tammie Mckeon, [address], [place]  Contact: [phone]

## 2022-09-16 NOTE — PROGRESS NOTES
4 Eyes Admission Assessment     I agree as the admission nurse that 2 RN's have performed a thorough Head to Toe Skin Assessment on the patient. ALL assessment sites listed below have been assessed on admission. Areas assessed by both nurses: Hector Parham and Lisa    [x]   Head, Face, and Ears   [x]   Shoulders, Back, and Chest  [x]   Arms, Elbows, and Hands   [x]   Coccyx, Sacrum, and Ischium  [x]   Legs, Feet, and Heels        Does the Patient have Skin Breakdown?   No         Aman Prevention initiated:  No   Wound Care Orders initiated:  No      [facility] nurse consulted for Pressure Injury (Stage 3,4, Unstageable, DTI, NWPT, and Complex wounds) or Aman score 18 or lower:  No      Nurse 1 eSignature: Electronically signed by Charmaine Vann RN on 9/15/22 at 10:23 PM EDT    **SHARE this note so that the co-signing nurse is able to place an eSignature**    Nurse 2 eSignature: Electronically signed by Angela Guajardo RN on 9/16/22 at 12:09 AM EDT

## 2022-09-16 NOTE — H&P
times daily. metoprolol succinate (TOPROL XL) 50 MG extended release tablet Take 2 tablets by mouth daily 90 tablet 3       Allergies:  Patient has no known allergies. Social History:   TOBACCO:   reports that she has never smoked. She has never used smokeless tobacco.     ETOH:   reports that she does not currently use alcohol. Family History:   History reviewed. No pertinent family history. ROS: Review of Systems - Negative except as in HPI. All other systems reviewed and are negative. PHYSICAL EXAM:  BP 99/74   Pulse 82   Temp 97.4 °F (36.3 °C) (Oral)   Resp 21   SpO2 95%     No results for input(s): POCGLU in the last 72 hours. Constitutional:       Appearance: Normal appearance. HENT:      Head: Normocephalic and atraumatic. Cardiovascular:      Rate and Rhythm: Normal rate and regular rhythm. Pulses: Normal pulses. Heart sounds: Normal heart sounds. No murmur heard. No friction rub. Pulmonary:      Effort: Pulmonary effort is normal.   Musculoskeletal:         General: Normal range of motion. Cervical back: Normal range of motion. Skin:     General: Skin is warm and dry. Neurological:      Mental Status: She is alert and oriented to person, place, and time. Psychiatric:         Mood and Affect: Mood normal.         Behavior: Behavior normal.       LABS:  Recent Labs     09/15/22  1559   WBC 7.4   HGB 15.5   HCT 45.9                                                                     Recent Labs     09/15/22  1559      K 3.6      CO2 21   BUN 16   CREATININE 0.9   GLUCOSE 167*     No results for input(s): AST, ALT, ALB, BILITOT, ALKPHOS in the last 72 hours. Recent Labs     09/15/22  1559   TROPONINI <0.01     No results for input(s): BNP in the last 72 hours. No results found for: PHART, XCB8MGP, PO2ART  No results for input(s): INR in the last 72 hours.   No results for input(s): NITRITE, COLORU, PHUR, LABCAST, [address], RBCUA, MUCUS, TRICHOMONAS, YEAST, BACTERIA, CLARITYU, SPECGRAV, LEUKOCYTESUR, UROBILINOGEN, BILIRUBINUR, BLOODU, GLUCOSEU, AMORPHOUS in the last 72 hours. Invalid input(s): KETONESU       Assessment & Plan:     68 y.o. female who presented with lightheadedness. Atrial fibrillation with rapid ventricular rate  Parox Afib, recent ablation  COVID 19 infection  Acquired hypothyroidism   Hypotension due to meds  Peripheral polyneuropathy; chemo induced. Lumbar spondylosis   Chronic pain syndrome  Hx of Breast cancer hx s/p chemoradiation. Failed out pxt management     Presents with lightheadedness, recent symptoms of COVID including weakness diarrhea and gen body aches. Noted with Afib, RVR; recent blation 2 months ago    On flecainide, BB and OAC  Consider Cardiology/EP follow up    Replenish K, Mg  Trend troponins     Update TSH with reflex  Cont synthroid     IV hydration     Supportive measures    COVID meds if requiring O2,      PT/OT eval        The patient and / or the family were informed of the results of any tests, a time was given to answer questions, a plan was proposed and they agreed with plan. Thank you Dr. Walker primary care provider on file. for the opportunity to be involved in this patients care. If you have any questions or concerns please feel free to contact me at [phone].   Full Code    Dalene Pallas, MD

## 2022-09-16 NOTE — PLAN OF CARE
Problem: Safety - Adult  Goal: Free from fall injury  Outcome: Progressing  Flowsheets (Taken 9/16/2022 0156)  Free From Fall Injury: Instruct family/caregiver on patient safety     Problem: Cardiovascular - Adult  Goal: Maintains optimal cardiac output and hemodynamic stability  Outcome: Progressing  Flowsheets (Taken 9/16/2022 0156)  Maintains optimal cardiac output and hemodynamic stability:   Monitor blood pressure and heart rate   Assess for signs of decreased cardiac output   Monitor urine output and notify Licensed Independent Practitioner for values outside of normal range     Problem: Cardiovascular - Adult  Goal: Absence of cardiac dysrhythmias or at baseline  Outcome: Progressing  Flowsheets (Taken 9/16/2022 0156)  Absence of cardiac dysrhythmias or at baseline:   Monitor cardiac rate and rhythm   Assess for signs of decreased cardiac output     Problem: Musculoskeletal - Adult  Goal: Return mobility to safest level of function  Outcome: Progressing  Flowsheets (Taken 9/16/2022 0156)  Return Mobility to Safest Level of Function:   Assess patient stability and activity tolerance for standing, transferring and ambulating with or without assistive devices   Assist with transfers and ambulation using safe patient handling equipment as needed   Instruct patient/family in ordered activity level

## 2022-09-16 NOTE — PROGRESS NOTES
Hospitalist Progress Note      PCP: No primary care provider on file. Date of Admission: 9/15/2022    Chief Complaint: Lightheadedness    HPI: \"[Debbie Mata Grandchild is a very pleasant 68 y.o. female with history of atrial fibrillation on Eliquis with cardiac ablation 2 months ago, hypothyroidism who presented with lightheadedness. She has had generalized body aches, fatigue, nonbloody diarrhea and increasing generalized weakness since about 5 days. She developed lightheadedness today. She was in the ED 3 days ago with sore throat, fever, diarrhea, fatigue and generalized weakness and was diagnosed with COVID-19 and was sent home. She returns after waking up this morning and feeling lightheaded when she tried to stand up. She denies chest pain, abdominal pain, urinary symptoms. She felt that she was in atrial fibrillation and took an additional dose of her metoprolol before calling her cardiologist who advised her to come to the emergency department. In the ED BP was 90/65. Othervitals fairly normal, but was noted to be in Afib with HR occasionally in the 110s. CXR: nil acute process. \"      Subjective: Patient seen and examined at bedside. She states she feels better. She continues to have paroxysmal atrial fibrillation. Blood pressure is better this morning. Will discontinue flecainide as she was not taking this prior to admission. Will also adjust metoprolol from 50 BID to 25 BID. Will give gentle IVF due to likely dehydration.        Medications:  Reviewed    Infusion Medications    sodium chloride       Scheduled Medications    apixaban  5 mg Oral BID    flecainide  100 mg Oral BID    levothyroxine  125 mcg Oral Daily    metoprolol tartrate  50 mg Oral BID    sodium chloride flush  5-40 mL IntraVENous 2 times per day    gabapentin  600 mg Oral BID     PRN Meds: sodium chloride flush, sodium chloride, ondansetron **OR** ondansetron, polyethylene glycol, acetaminophen **OR** acetaminophen, hydrALAZINE, melatonin, diphenoxylate-atropine, HYDROcodone 5 mg - acetaminophen      Intake/Output Summary (Last 24 hours) at 9/16/2022 0937  Last data filed at 9/16/2022 0900  Gross per 24 hour   Intake --   Output 370 ml   Net -370 ml       Physical Exam Performed:    /70   Pulse 64   Temp 97.6 °F (36.4 °C) (Oral)   Resp 16   Ht 5' 6\" (1.676 m)   Wt 151 lb 3.8 oz (68.6 kg)   SpO2 96%   BMI 24.41 kg/m²     General appearance: No apparent distress, appears stated age and cooperative. HEENT: Pupils equal, round, and reactive to light. Conjunctivae/corneas clear. Neck: Supple, with full range of motion. No jugular venous distention. Trachea midline. Respiratory:  Normal respiratory effort. Tight lung sounds diffusely; cough productive of yellow sputum  Cardiovascular: Regular rate and rhythm with normal S1/S2 without murmurs, rubs or gallops. Abdomen: Soft, non-tender, non-distended with normal bowel sounds. Musculoskeletal: No clubbing, cyanosis or edema bilaterally. Full range of motion without deformity. Skin: Skin color, texture, turgor normal.  No rashes or lesions. Neurologic:  Neurovascularly intact without any focal sensory/motor deficits. Cranial nerves: II-XII intact, grossly non-focal.  Psychiatric: Alert and oriented, thought content appropriate, normal insight  Capillary Refill: Brisk,3 seconds, normal   Peripheral Pulses: +2 palpable, equal bilaterally       Labs:   Recent Labs     09/15/22  1559 09/16/22  0445   WBC 7.4 4.2   HGB 15.5 14.4   HCT 45.9 43.0    140     Recent Labs     09/15/22  1559 09/16/22  0445    139   K 3.6 4.0    106   CO2 21 22   BUN 16 14   CREATININE 0.9 0.6   CALCIUM 9.2 8.6     Recent Labs     09/15/22  [address] <0.01       Radiology:  XR CHEST PORTABLE   Final Result   1. Stable chest   2.  No active airspace disease          Assessment/Plan:    Active Hospital Problems    Diagnosis     Atrial fibrillation with rapid ventricular response (HCC) [I48.91]      Paroxysmal Afib, recent ablation  Ablation 2 months ago. Patient reportedly not taking flecainide at home. - Discontinue flecainide  - Lopressor 25mg PO BID  - Eliquis 5mg BID    COVID 19 infection  Diagnosed 3 days prior to hospitalization on   - Supplemental oxygen protocol  - Initiate COVID-19 medications if requiring supplemental O2  - NS @ 75cc/h    Acquired hypothyroidism   - Synthroid 125mcg QD    Peripheral polyneuropathy; chemotherapy-induced  - Gabapentin 600mg BID    Lumbar spondylosis   - Norco 5mg Q6H PRN    Chronic pain syndrome  - Norco 5mg Q6H PRN as above    Hx of Breast cancer hx s/p chemoradiation  - Monitor for signs/symptom of recurrence    Hypotension due to meds - resolved  Orthostatic vitals: /72 & HR 95 lying down, 102/53 & 90 sitting, 85/49 & 107 standing. DVT Prophylaxis: Eliquis  Diet: ADULT DIET; Regular  Code Status: Full Code    PT/OT Eval Status: ordered    Ellen Carr MD     I have seen, examined and evaluated the patient as did the resident physician, Dr. Liana Simmons. We have discussed the plan of care and decisions made during that discussion were incorporated into this note. I have reviewed the resident physician's note and agree with the assessment and plan of care as documented.      D/c tomorrow if bp is better, need home care

## 2022-09-16 NOTE — ACP (ADVANCE CARE PLANNING)
Advance Care Planning     Advance Care Planning Inpatient Note  Spiritual Care Department    Today's Date: 9/16/2022  Unit: [place] [address] PCU    Received request from admission screening. Upon review of chart and communication with care team, patient's decision making abilities are not in question. . Patient was/were present in the room during visit.     Goals of ACP Conversation:  Discuss advance care planning documents    Health Care Decision Makers:       Primary Decision Maker: Karlos Shah - [facility] - [phone]  Summary:  Mikey Almonte  Documented Next of Kin, per patient report    Advance Care Planning Documents (Patient Wishes):  None     Assessment:  N/A     Interventions:  Provided education on documents for clarity and greater understanding  Discussed and provided education on state decision maker hierarchy  Encouraged ongoing ACP conversation with future decision makers and loved ones    Care Preferences Communicated:   No    Outcomes/Plan:  ACP Discussion: Completed  Teach Back Method used to verify the patient's and/or Healthcare Decision Maker's understanding of key information in the advance directive documents    Electronically signed by Liang Díaz [facility] on 9/16/2022 at 2:44 PM

## 2022-09-16 NOTE — PLAN OF CARE
Problem: Safety - Adult  Goal: Free from fall injury  9/16/2022 0802 by Leander Gibson RN  Outcome: Progressing  9/16/2022 0156 by Renita Alonzo RN  Outcome: Progressing  Flowsheets (Taken 9/16/2022 0156)  Free From Fall Injury: Francisca Bueno family/caregiver on patient safety     Problem: ABCDS Injury Assessment  Goal: Absence of physical injury  Outcome: Progressing     Problem: Cardiovascular - Adult  Goal: Maintains optimal cardiac output and hemodynamic stability  9/16/2022 0802 by Leander Gibson RN  Outcome: Progressing  9/16/2022 0156 by Renita Alonzo RN  Outcome: Progressing  Flowsheets (Taken 9/16/2022 0156)  Maintains optimal cardiac output and hemodynamic stability:   Monitor blood pressure and heart rate   Assess for signs of decreased cardiac output   Monitor urine output and notify Licensed Independent Practitioner for values outside of normal range  Goal: Absence of cardiac dysrhythmias or at baseline  9/16/2022 0156 by Renita Alonzo RN  Outcome: Progressing  Flowsheets (Taken 9/16/2022 0156)  Absence of cardiac dysrhythmias or at baseline:   Monitor cardiac rate and rhythm   Assess for signs of decreased cardiac output     Problem: Musculoskeletal - Adult  Goal: Return mobility to safest level of function  9/16/2022 0156 by Renita Alonzo RN  Outcome: Progressing  Flowsheets (Taken 9/16/2022 0156)  Return Mobility to Safest Level of Function:   Assess patient stability and activity tolerance for standing, transferring and ambulating with or without assistive devices   Assist with transfers and ambulation using safe patient handling equipment as needed   Instruct patient/family in ordered activity level

## 2022-09-16 NOTE — PROGRESS NOTES
Occupational Therapy  Facility/Department: [place] [address] [facility]  Occupational Therapy Initial Assessment    Name: Ira Chakraborty  : 1945  MRN: [id number]  Date of Service: 2022    Discharge Recommendations: Ira Chakraborty scored a 21/24 on the AM-PAC ADL Inpatient form. Current research shows that an AM-PAC score of 18 or greater is typically associated with a discharge to the patient's home setting. Based on the patient's AM-PAC score, and their current ADL deficits, it is recommended that the patient have 2-3 sessions per week of Occupational Therapy at d/c to increase the patient's independence. At this time, this patient demonstrates the endurance and safety to discharge home with home services and a follow up treatment frequency of 2-3x/wk. Please see assessment section for further patient specific details. If patient discharges prior to next session this note will serve as a discharge summary. Please see below for the latest assessment towards goals. OT Equipment Recommendations  Equipment Needed: Yes  Other: shower chair ( pt reports she has) ; 2WW    Patient Diagnosis(es): The primary encounter diagnosis was COVID. Diagnoses of Near syncope and Atrial fibrillation with RVR ([address] were also pertinent to this visit. Past Medical History:  has a past medical history of Atrial fibrillation ([address] Breast cancer ([address] Neuropathy, and Thyroid disease. Past Surgical History:  has a past surgical history that includes Tonsillectomy and Cardiac surgery. Treatment Diagnosis: decreased endurance for ADLs and fx mobility w/ generalized weakness      Assessment   Performance deficits / Impairments: Decreased endurance;Decreased functional mobility   Assessment: Pt is from home alone and typically highly independent at baseline. Pt now Covid + with times of Afib (not evidenced during session) and slightly below her baseline.  Pt required SBA throughout session for transfers and CGA for fx mobility while furniture walking. Pt was provided with 2WW and educated on use, which pt reports her home is too cluttered to fit it through. Pt is able to demo all ADLs but needing rest breaks. Pt would benefit from [facility] for f/u but pt denies want for anyone coming to her home at discharge. Pt educated on HEP to perform while in hospital and pt returned demonstration. Will cont to follow 1-2 more times while in acute care to ensure pt continues progressing and improving strength/edurance. Treatment Diagnosis: decreased endurance for ADLs and fx mobility w/ generalized weakness  Prognosis: Good  Decision Making: Medium Complexity  REQUIRES OT FOLLOW-UP: Yes (1-2 more times)  Activity Tolerance  Activity Tolerance: Patient limited by fatigue  Activity Tolerance Comments: BP remained stable throughout session; refer to vital section; pt with mild complaints of dizziness during ambulation that resolved with rest; pt reports generalized weakness and fatigue with ax        Plan   Plan  Times per Week: 1-2x  Current Treatment Recommendations: Strengthening, Functional mobility training, Endurance training, Safety education & training, Self-Care / ADL, Home management training     Restrictions  Position Activity Restriction  Other position/activity restrictions: droplet + isolation; up with assistance; OT/PT eval and treat    Subjective   General  Chart Reviewed: Yes  Patient assessed for rehabilitation services?: Yes  Additional Pertinent Hx: 68 y.o. female with history of atrial fibrillation on Eliquis with cardiac ablation 2 months ago, hypothyroidism who presented with lightheadedness. She has had generalized body aches, fatigue, nonbloody diarrhea and increasing generalized weakness since about 5 days. She was in the ED several days prior with sore throat, fever, diarrhea, fatigue and generalized weakness and was diagnosed with COVID-19 9/12 and was sent home.  She returns with lightheadness & in A-fib w/ orthostatic place;Call light within reach;Gait belt;Left in chair;Nurse notified  Bed Mobility Training  Bed Mobility Training: Yes  Supine to Sit: Stand-by assistance  Sit to Supine: Stand-by assistance  Scooting: Stand-by assistance  Balance  Sitting: Intact  Standing: Impaired (pt reaches for things to hold onto; pt was provided with 2WW in room)  Standing - Static: Fair  Standing - Dynamic: Constant support; Fair  Transfer Training  Transfer Training: Yes  Overall Level of Assistance: Stand-by assistance  Sit to Stand: Stand-by assistance  Stand to Sit: Stand-by assistance  Bed to Chair: Stand-by assistance  Toilet Transfer: Stand-by assistance  Gait  Overall Level of Assistance: Contact-guard assistance  Interventions: Safety awareness training;Verbal cues  Distance (ft):  (pt walked to/from bathroom and returned to chair; pt would benefit from 2WW 2/2 furniture walking- pt was provided walker in room)  Assistive Device: Walker, rolling  Toilet Transfers  Toilet - Technique: Ambulating  Equipment Used: Grab bars  Toilet Transfer: Stand by assistance  AROM: Within functional limits  Strength: Within functional limits  Coordination: Within functional limits  Tone: Normal  Sensation: Impaired (Pt has neuropathy on L hand and R foot)  ADL  Feeding: Independent;Setup  Grooming: Stand by assistance  Grooming Skilled Clinical Factors: pt required holding onto grab bar at one point 2/2 feeling dizzy, however, dizziness resolved. Pt stood at sink and wash hands, face, brushed teeth, and combed hair  UE Dressing Skilled Clinical Factors: dont expect pt would require assistance  LE Dressing: Stand by assistance  Toileting: Stand by assistance     Activity Tolerance  Activity Tolerance: Patient tolerated evaluation without incident;Patient limited by fatigue;Patient limited by endurance  Activity Tolerance Comments: BP stable, - for orthostatic hypotension.  pt did have dizzy spell in bathroom attributed to fatigue and poor functional capacity     Transfers  Sit to stand: Stand by assistance  Stand to sit: Stand by assistance  Vision  Vision: Impaired  Vision Exceptions: Wears glasses at all times  Hearing  Hearing: Exceptions to [facility]  Hearing Exceptions: Hard of hearing/hearing concerns;Bilateral hearing aid (does not have hearing aids with her)  Cognition  Overall Cognitive Status: WNL  Orientation  Overall Orientation Status: Within Normal Limits  Orientation Level: Oriented X4               Exercise Treatment: Pt educated on HEP to perform while in hospital and pt returned demonstration. Pt performed BUE chest press, chair push ups, leg raises, heel slides, and ankle pumps  Education Given To: Patient  Education Provided: Role of Therapy;Plan of Care;Transfer Training;ADL Adaptive Strategies; Energy Conservation;IADL Safety;Equipment  Education Provided Comments: education provided on use of shower chair, 2WW, rest breaks, and HHOT. . Pt denying want for home services and denying want for 2WW despite recommendation  Education Method: Verbal  Barriers to Learning: None  Education Outcome: Verbalized understanding;Continued education needed                        G-Code     OutComes Score                                                  AM-PAC Score        -[facility] Inpatient Daily Activity Raw Score: 21 (09/16/22 1235)  AM-PAC Inpatient ADL T-Scale Score : 44.27 (09/16/22 1235)  ADL Inpatient CMS 0-100% Score: 32.79 (09/16/22 1235)  ADL Inpatient CMS G-Code Modifier : Tonio Espinoza (09/16/22 [facility])       Goals  Short Term Goals  Time Frame for Short term goals: by discharge  Short Term Goal 1: Pt will perform standing tolerance for 5 min without complaints of dizziness and no need for seated rest breaks  Short Term Goal 2: Pt will perform UB and LB dressing routine with independence  Short Term Goal 3: Pt will verbalize 3 energy conservation strategies independently  Patient Goals   Patient goals : to return home       Therapy Time   Individual Concurrent Group Co-treatment   Time In 1038         Time Out 1118         Minutes 40         Timed Code Treatment Minutes: 25 Minutes (+ 15 min OT mario)       Jim Whittaker, OT

## 2022-09-16 NOTE — PROGRESS NOTES
Physical Therapy  Facility/Department: [place] [address] [facility]  Physical Therapy Initial Assessment    Name: Jessee Denton  :   MRN: [id number]  Date of Service: 2022    Discharge Recommendations: Jessee Denton scored a 18/24 on the AM-PAC short mobility form. Current research shows that an AM-PAC score of 18 or greater is typically associated with a discharge to the patient's home setting. Based on the patient's AM-PAC score and their current functional mobility deficits, it is recommended that the patient have 2-3 sessions per week of Physical Therapy at d/c to increase the patient's independence. At this time, this patient demonstrates the endurance and safety to discharge home with home PT and a follow up treatment frequency of 2-3x/wk. Please see assessment section for further patient specific details. If patient discharges prior to next session this note will serve as a discharge summary. Please see below for the latest assessment towards goals. PT Equipment Recommendations  Equipment Needed: Yes  Mobility Devices: Lilly Bob: Luis      Patient Diagnosis(es): The primary encounter diagnosis was COVID. Diagnoses of Near syncope and Atrial fibrillation with RVR ([address] were also pertinent to this visit. Past Medical History:  has a past medical history of Atrial fibrillation ([address] Breast cancer ([address] Neuropathy, and Thyroid disease. Past Surgical History:  has a past surgical history that includes Tonsillectomy and Cardiac surgery. Assessment   Body Structures, Functions, Activity Limitations Requiring Skilled Therapeutic Intervention: Decreased functional mobility ; Decreased balance;Decreased endurance;Decreased safe awareness;Decreased strength  Assessment: pt is a 69 yo female from home with  IND at baseline presenting after covid diagnosis and bout of a-fib with dizziness and general weakness.  pt funcitoning below baseline function requiring SBA-CGA for mobility and use of RW for increased stability. pt negative for orthostatic hypotension however still had an incident of dizziness in bathroom requiring rest break and could not tolerate further ambulation due to fatigue. pt would benefit from initial 24hr A and home PT up dc to maximize independence. PT to f/u  Treatment Diagnosis: dec functional mobility  Therapy Prognosis: Good  Decision Making: Medium Complexity  Requires PT Follow-Up: Yes  Activity Tolerance  Activity Tolerance: Patient tolerated evaluation without incident;Patient limited by fatigue;Patient limited by endurance  Activity Tolerance Comments: BP stable, - for orthostatic hypotension.  pt did have dizzy spell in bathroom attributed to fatigue and poor functional capacity     Plan   Plan  Plan:  (2-5)  Current Treatment Recommendations: Strengthening, Balance training, Functional mobility training, Transfer training, Stair training, Gait training, Neuromuscular re-education, Endurance training, Home exercise program, Safety education & training, Patient/Caregiver education & training, Therapeutic activities  Safety Devices  Type of Devices: Chair alarm in place, Call light within reach, Gait belt, Left in chair, Nurse notified     Restrictions  Position Activity Restriction  Other position/activity restrictions: droplet + isolation; up with assistance; OT/PT eval and treat     Subjective   Pain: no reported pain  General  Chart Reviewed: Yes  Patient assessed for rehabilitation services?: Yes  Additional Pertinent Hx: pt is a 69 yo female with recent covid 19 diagnosis now returning to the hospital due to dizziness and general weakness found to be in A-fib. pt has a hx of a-fib but has since been controlled  Referring Practitioner: Derald Shone, MD  Diagnosis: a-fib  Follows Commands: Within Functional Limits  Subjective  Subjective: pt supine in bed and agreeable to PT         Social/Functional History  Social/Functional History  Lives With: Alone  Type of Home: House  Home Layout: Two level, 1/2 bath on main level, Bed/Bath upstairs  Home Access: Stairs to enter with rails, Level entry  Entrance Stairs - Number of Steps: 13 stairs to get up to second level and that's the only level that has a bathroom  Bathroom Shower/Tub: Tub/Shower unit, Walk-in shower  Bathroom Toilet: Standard  Bathroom Equipment:  (never needed them)  Bathroom Accessibility: Accessible (besides it's on the second floor)  Home Equipment:  (never needed)  Has the patient had two or more falls in the past year or any fall with injury in the past year?: No (pt had one fall where she tripped from stepping in a hole)  ADL Assistance: Independent  Homemaking Assistance: Independent  Homemaking Responsibilities: Yes  Meal Prep Responsibility: Primary  Laundry Responsibility: Primary  Cleaning Responsibility: Primary  Bill Paying/Finance Responsibility: Primary  Shopping Responsibility: Primary  Ambulation Assistance: Independent  Transfer Assistance: Independent  Active : Yes  Mode of Transportation: Car  Occupation: Retired  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   Additional Comments: neighbors on both sides that check in on patient frequently  Vision/Hearing       Cognition         Objective   Heart Rate: 73  Heart Rate Source: Monitor  BP: (!) 132/92 standing, 130/88 sitting, 127/82 supine  BP Location: Left upper arm  BP Method: Automatic  Patient Position: Left side;Standing  MAP (Calculated): 105.33  Resp: 18  SpO2: 96 %  O2 Device: None (Room air)  Comment: pt with persistent cough                 Strength RLE  Strength RLE: WFL  Strength LLE  Strength LLE: WFL        Bed Mobility Training  Bed Mobility Training: Yes  Supine to Sit: Stand-by assistance  Sit to Supine: Stand-by assistance  Scooting: Stand-by assistance  Balance  Sitting: Intact  Standing: Impaired (pt reaches for things to hold onto; pt was provided with 2WW in room)  Standing - Static: [address]  Standing - functional transfers with LRAD and mod I  Short term goal 3: pt will ambulate 150' with LRAD and mod I  Patient Goals   Patient goals : to go home       Education  Patient Education  Education Given To: Patient  Education Provided: Role of Therapy;Plan of Care; Fall Prevention Strategies  Education Provided Comments: use of RW which pt is agreeable to in hospital but says it will not fit in home  Education Method: Demonstration;Verbal  Barriers to Learning: None  Education Outcome: Verbalized understanding;Continued education needed      Therapy Time   Individual Concurrent Group Co-treatment   Time In 1038         Time Out 1118         Minutes 40              Timed Code Treatment Minutes:  25 min     Total Treatment Minutes:  40 min       Puentes Net, PT

## 2022-09-17 LAB
T4 FREE: 1.4 NG/DL (ref 0.9–1.8)
TSH REFLEX: 5.91 UIU/ML (ref 0.27–4.2)

## 2022-09-17 PROCEDURE — 1200000000 HC SEMI PRIVATE

## 2022-09-17 PROCEDURE — 6370000000 HC RX 637 (ALT 250 FOR IP): Performed by: INTERNAL MEDICINE

## 2022-09-17 PROCEDURE — 2580000003 HC RX 258: Performed by: INTERNAL MEDICINE

## 2022-09-17 RX ORDER — METOPROLOL TARTRATE 5 MG/5ML
5 INJECTION INTRAVENOUS ONCE
Status: DISCONTINUED | OUTPATIENT
Start: 2022-09-17 | End: 2022-09-18 | Stop reason: HOSPADM

## 2022-09-17 RX ADMIN — APIXABAN 5 MG: 5 TABLET, FILM COATED ORAL at 19:49

## 2022-09-17 RX ADMIN — GABAPENTIN 600 MG: 300 CAPSULE ORAL at 08:22

## 2022-09-17 RX ADMIN — METOPROLOL TARTRATE 25 MG: 25 TABLET, FILM COATED ORAL at 08:22

## 2022-09-17 RX ADMIN — METOPROLOL TARTRATE 25 MG: 25 TABLET, FILM COATED ORAL at 19:49

## 2022-09-17 RX ADMIN — LEVOTHYROXINE SODIUM 125 MCG: 0.12 TABLET ORAL at 08:22

## 2022-09-17 RX ADMIN — SODIUM CHLORIDE, PRESERVATIVE FREE 10 ML: 5 INJECTION INTRAVENOUS at 08:22

## 2022-09-17 RX ADMIN — GABAPENTIN 600 MG: 300 CAPSULE ORAL at 19:50

## 2022-09-17 RX ADMIN — APIXABAN 5 MG: 5 TABLET, FILM COATED ORAL at 08:22

## 2022-09-17 RX ADMIN — SODIUM CHLORIDE, PRESERVATIVE FREE 10 ML: 5 INJECTION INTRAVENOUS at 19:50

## 2022-09-17 ASSESSMENT — PAIN SCALES - GENERAL
PAINLEVEL_OUTOF10: 0

## 2022-09-17 NOTE — PLAN OF CARE
Problem: Discharge Planning  Goal: Discharge to home or other facility with appropriate resources  9/17/2022 0844 by Hal Serna RN  Outcome: Progressing  Flowsheets (Taken 9/17/2022 0844)  Discharge to home or other facility with appropriate resources:   Identify barriers to discharge with patient and caregiver   Arrange for needed discharge resources and transportation as appropriate  9/17/2022 0655 by Reji Paniagua RN  Outcome: Progressing  Flowsheets (Taken 9/15/2022 2057 by Wil Phipps RN)  Discharge to home or other facility with appropriate resources:   Identify barriers to discharge with patient and caregiver   Identify discharge learning needs (meds, wound care, etc)   Refer to discharge planning if patient needs post-hospital services based on physician order or complex needs related to functional status, cognitive ability or social support system   Arrange for needed discharge resources and transportation as appropriate     Problem: ABCDS Injury Assessment  Goal: Absence of physical injury  Outcome: Progressing     Problem: Cardiovascular - Adult  Goal: Maintains optimal cardiac output and hemodynamic stability  9/17/2022 0844 by Hal Serna RN  Outcome: Progressing  Flowsheets (Taken 9/17/2022 0844)  Maintains optimal cardiac output and hemodynamic stability:   Monitor blood pressure and heart rate   Monitor urine output and notify Licensed Independent Practitioner for values outside of normal range  9/17/2022 0655 by Reji Paniagua RN  Outcome: Progressing  Note: Pt remained in normal sinus rhythm overnight  Goal: Absence of cardiac dysrhythmias or at baseline  9/17/2022 0655 by Reji Paniagua RN  Outcome: Progressing  Note: Pt remained in normal sinus rhythm overnight

## 2022-09-17 NOTE — PROGRESS NOTES
Hospitalist Progress Note      PCP: No primary care provider on file. Date of Admission: 9/15/2022    Chief Complaint: [facility] Course: This is a 15-year-old female with a history of atrial fibrillation on Eliquis status post ablation 2 months ago, hypothyroidism admitted with lightheadedness in the emergency room patient with blood pressure 90/50, 3 days ago seen in the emergency room for sore throat, fever diarrhea fatigue and generalized weakness was diagnosed with COVID 19 sent home. Subjective: Patient lying in the bed complains of her weakness denies any chest pain or shortness of breath no nausea vomiting on room air. Medications:  Reviewed    Infusion Medications    sodium chloride       Scheduled Medications    metoprolol tartrate  25 mg Oral BID    apixaban  5 mg Oral BID    levothyroxine  125 mcg Oral Daily    sodium chloride flush  5-40 mL IntraVENous 2 times per day    gabapentin  600 mg Oral BID     PRN Meds: sodium chloride flush, sodium chloride, ondansetron **OR** ondansetron, polyethylene glycol, acetaminophen **OR** acetaminophen, hydrALAZINE, melatonin, diphenoxylate-atropine, HYDROcodone 5 mg - acetaminophen      Intake/Output Summary (Last 24 hours) at 9/17/2022 1005  Last data filed at 9/17/2022 0841  Gross per 24 hour   Intake 180 ml   Output --   Net 180 ml       Physical Exam Performed:    /79   Pulse 59   Temp 98.5 °F (36.9 °C) (Oral)   Resp 20   Ht 5' 6\" (1.676 m)   Wt 151 lb 3.8 oz (68.6 kg)   SpO2 95%   BMI 24.41 kg/m²     General appearance: No apparent distress, appears stated age and cooperative. HEENT: Pupils equal, round, and reactive to light. Conjunctivae/corneas clear. Neck: Supple, with full range of motion. No jugular venous distention. Trachea midline. Respiratory:  Normal respiratory effort. Clear to auscultation, bilaterally without Rales/Wheezes/Rhonchi.   Cardiovascular: Regular rate and rhythm with normal S1/S2 without murmurs, rubs or gallops. Abdomen: Soft, non-tender, non-distended with normal bowel sounds. Musculoskeletal: No clubbing, cyanosis or edema bilaterally. Full range of motion without deformity. Skin: Skin color, texture, turgor normal.  No rashes or lesions. Neurologic:  Neurovascularly intact without any focal sensory/motor deficits. Cranial nerves: II-XII intact, grossly non-focal.  Psychiatric: Alert and oriented, thought content appropriate, normal insight  Capillary Refill: Brisk, 3 seconds, normal   Peripheral Pulses: +2 palpable, equal bilaterally       Labs:   Recent Labs     09/15/22  1559 09/16/22  0445   WBC 7.4 4.2   HGB 15.5 14.4   HCT 45.9 43.0    140     Recent Labs     09/15/22  1559 09/16/22  0445    139   K 3.6 4.0    106   CO2 21 22   BUN 16 14   CREATININE 0.9 0.6   CALCIUM 9.2 8.6     No results for input(s): AST, ALT, BILIDIR, BILITOT, ALKPHOS in the last 72 hours. No results for input(s): INR in the last 72 hours. Recent Labs     09/15/22  1559   TROPONINI <0.01       Urinalysis:    No results found for: Rodrigo Kugel, BACTERIA, RBCUA, BLOODU, SPECGRAV, GLUCOSEU    Radiology:  XR CHEST PORTABLE   Final Result   1. Stable chest   2. No active airspace disease              Assessment/Plan:    Active Hospital Problems    Diagnosis     Atrial fibrillation with rapid ventricular response ([address] [I48.91]      This is a 59-year-old female admitted with a weakness and fatigue 4 days ago positive COVID on room air continue with the supportive care. Paroxysmal atrial fibrillation status post ablation 2 months ago patient is not taking flecainide continue with the Eliquis, Lopressor. Hypothyroidism continue with the Synthroid. Peripheral neuropathy on gabapentin 600 mg 1 p.o. twice daily. History of lumbar spondylosis /chronic pain syndrome continue with the Norco as needed. History of breast cancer status post chemoradiation.   Hypotension present on admission resolved with IV hydration continue to monitor. DVT Prophylaxis: Eliquis  Diet: ADULT DIET;  Regular  ADULT ORAL NUTRITION SUPPLEMENT; Breakfast, Dinner; Standard High Calorie/High Protein Oral Supplement  Code Status: Full Code  PT/OT Eval Status: Nico Arzola MD

## 2022-09-17 NOTE — PLAN OF CARE
Problem: Discharge Planning  Goal: Discharge to home or other facility with appropriate resources  Outcome: Progressing  Flowsheets (Taken 9/15/2022 2057 by Naz Miles, RN)  Discharge to home or other facility with appropriate resources:   Identify barriers to discharge with patient and caregiver   Identify discharge learning needs (meds, wound care, etc)   Refer to discharge planning if patient needs post-hospital services based on physician order or complex needs related to functional status, cognitive ability or social support system   Arrange for needed discharge resources and transportation as appropriate     Problem: Safety - Adult  Goal: Free from fall injury  Outcome: Progressing  Flowsheets (Taken 9/16/2022 0156 by Naz Miles RN)  Free From Fall Injury: Instruct family/caregiver on patient safety     Problem: Cardiovascular - Adult  Goal: Maintains optimal cardiac output and hemodynamic stability  Outcome: Progressing  Note: Pt remained in normal sinus rhythm overnight     Problem: Cardiovascular - Adult  Goal: Absence of cardiac dysrhythmias or at baseline  Outcome: Progressing  Note: Pt remained in normal sinus rhythm overnight

## 2022-09-18 VITALS
BODY MASS INDEX: 24.23 KG/M2 | DIASTOLIC BLOOD PRESSURE: 64 MMHG | TEMPERATURE: 97.8 F | WEIGHT: 150.79 LBS | HEIGHT: 66 IN | RESPIRATION RATE: 20 BRPM | OXYGEN SATURATION: 94 % | HEART RATE: 59 BPM | SYSTOLIC BLOOD PRESSURE: 113 MMHG

## 2022-09-18 LAB
ANION GAP SERPL CALCULATED.3IONS-SCNC: 7 MMOL/L (ref 3–16)
BUN BLDV-MCNC: 15 MG/DL (ref 7–20)
CALCIUM SERPL-MCNC: 8.8 MG/DL (ref 8.3–10.6)
CHLORIDE BLD-SCNC: 108 MMOL/L (ref 99–110)
CO2: 23 MMOL/L (ref 21–32)
CREAT SERPL-MCNC: 0.8 MG/DL (ref 0.6–1.2)
GFR AFRICAN AMERICAN: >60
GFR NON-AFRICAN AMERICAN: >60
GLUCOSE BLD-MCNC: 92 MG/DL (ref 70–99)
HCT VFR BLD CALC: 44.9 % (ref 36–48)
HEMOGLOBIN: 14.9 G/DL (ref 12–16)
MCH RBC QN AUTO: 31 PG (ref 26–34)
MCHC RBC AUTO-ENTMCNC: 33.2 G/DL (ref 31–36)
MCV RBC AUTO: 93.2 FL (ref 80–100)
PDW BLD-RTO: 14.2 % (ref 12.4–15.4)
PLATELET # BLD: 190 K/UL (ref 135–450)
PMV BLD AUTO: 8.4 FL (ref 5–10.5)
POTASSIUM REFLEX MAGNESIUM: 4.5 MMOL/L (ref 3.5–5.1)
RBC # BLD: 4.81 M/UL (ref 4–5.2)
SODIUM BLD-SCNC: 138 MMOL/L (ref 136–145)
WBC # BLD: 6.4 K/UL (ref 4–11)

## 2022-09-18 PROCEDURE — 6370000000 HC RX 637 (ALT 250 FOR IP): Performed by: INTERNAL MEDICINE

## 2022-09-18 PROCEDURE — 80048 BASIC METABOLIC PNL TOTAL CA: CPT

## 2022-09-18 PROCEDURE — 36415 COLL VENOUS BLD VENIPUNCTURE: CPT

## 2022-09-18 PROCEDURE — 85027 COMPLETE CBC AUTOMATED: CPT

## 2022-09-18 PROCEDURE — 2580000003 HC RX 258: Performed by: INTERNAL MEDICINE

## 2022-09-18 RX ADMIN — GABAPENTIN 600 MG: 300 CAPSULE ORAL at 08:21

## 2022-09-18 RX ADMIN — LEVOTHYROXINE SODIUM 125 MCG: 0.12 TABLET ORAL at 05:47

## 2022-09-18 RX ADMIN — APIXABAN 5 MG: 5 TABLET, FILM COATED ORAL at 08:21

## 2022-09-18 RX ADMIN — SODIUM CHLORIDE, PRESERVATIVE FREE 10 ML: 5 INJECTION INTRAVENOUS at 08:21

## 2022-09-18 ASSESSMENT — PAIN SCALES - GENERAL
PAINLEVEL_OUTOF10: 0
PAINLEVEL_OUTOF10: 0

## 2022-09-18 NOTE — PROGRESS NOTES
Discharge instructions, medication regimen and follow-up appointments explained and given to pt. All questions answered. Pt verbalizes understanding when to seek medical attention. IV removed with tip intact. Telemonitor discontinued. Pt stable at discharge.

## 2022-09-18 NOTE — PLAN OF CARE
Problem: Safety - Adult  Goal: Free from fall injury  9/18/2022 0912 by Joaquín Denney RN  Outcome: Progressing  Flowsheets (Taken 9/18/2022 0912)  Free From Fall Injury: Instruct family/caregiver on patient safety  9/18/2022 0209 by Patito Bucio RN  Outcome: Progressing  Flowsheets (Taken 9/17/2022 1943)  Free From Fall Injury: Instruct family/caregiver on patient safety     Problem: ABCDS Injury Assessment  Goal: Absence of physical injury  9/18/2022 0912 by Joaquín Denney RN  Outcome: Progressing  9/18/2022 0209 by Patito Bucio RN  Outcome: Progressing  Flowsheets (Taken 9/17/2022 1943)  Absence of Physical Injury: Implement safety measures based on patient assessment     Problem: Cardiovascular - Adult  Goal: Maintains optimal cardiac output and hemodynamic stability  9/18/2022 0912 by Joaquín Denney RN  Outcome: Progressing  Flowsheets (Taken 9/18/2022 0912)  Maintains optimal cardiac output and hemodynamic stability:   Monitor blood pressure and heart rate   Monitor urine output and notify Licensed Independent Practitioner for values outside of normal range  9/18/2022 0209 by Patito Bucio RN  Outcome: Progressing  Flowsheets (Taken 9/17/2022 1943)  Maintains optimal cardiac output and hemodynamic stability:   Monitor blood pressure and heart rate   Monitor urine output and notify Licensed Independent Practitioner for values outside of normal range   Assess for signs of decreased cardiac output  Goal: Absence of cardiac dysrhythmias or at baseline  9/18/2022 0209 by Patito Bucio RN  Outcome: Progressing  Flowsheets (Taken 9/17/2022 1943)  Absence of cardiac dysrhythmias or at baseline:   Monitor cardiac rate and rhythm   Assess for signs of decreased cardiac output   Administer antiarrhythmia medication and electrolyte replacement as ordered

## 2022-09-18 NOTE — PLAN OF CARE
Problem: Discharge Planning  Goal: Discharge to home or other facility with appropriate resources  Outcome: Progressing  Flowsheets (Taken 9/17/2022 1943)  Discharge to home or other facility with appropriate resources:   Identify barriers to discharge with patient and caregiver   Arrange for needed discharge resources and transportation as appropriate   Identify discharge learning needs (meds, wound care, etc)     Problem: Safety - Adult  Goal: Free from fall injury  Outcome: Progressing  Flowsheets (Taken 9/17/2022 1943)  Free From Fall Injury: Instruct family/caregiver on patient safety     Problem: ABCDS Injury Assessment  Goal: Absence of physical injury  Outcome: Progressing  Flowsheets (Taken 9/17/2022 1943)  Absence of Physical Injury: Implement safety measures based on patient assessment     Problem: ABCDS Injury Assessment  Goal: Absence of physical injury  Outcome: Progressing  Flowsheets (Taken 9/17/2022 1943)  Absence of Physical Injury: Implement safety measures based on patient assessment     Problem: Cardiovascular - Adult  Goal: Maintains optimal cardiac output and hemodynamic stability  Outcome: Progressing  Flowsheets (Taken 9/17/2022 1943)  Maintains optimal cardiac output and hemodynamic stability:   Monitor blood pressure and heart rate   Monitor urine output and notify Licensed Independent Practitioner for values outside of normal range   Assess for signs of decreased cardiac output     Problem: Cardiovascular - Adult  Goal: Absence of cardiac dysrhythmias or at baseline  Outcome: Progressing  Flowsheets (Taken 9/17/2022 1943)  Absence of cardiac dysrhythmias or at baseline:   Monitor cardiac rate and rhythm   Assess for signs of decreased cardiac output   Administer antiarrhythmia medication and electrolyte replacement as ordered     Problem: Musculoskeletal - Adult  Goal: Return mobility to safest level of function  Outcome: Progressing  Flowsheets (Taken 9/17/2022 1943)  Return Mobility to Safest Level of Function:   Assess patient stability and activity tolerance for standing, transferring and ambulating with or without assistive devices   Assist with transfers and ambulation using safe patient handling equipment as needed   Ensure adequate protection for wounds/incisions during mobilization

## 2022-09-18 NOTE — PROGRESS NOTES
Hospitalist Progress Note      PCP: No primary care provider on file. Date of Admission: 9/15/2022    Chief Complaint:  [place] Hospital Course: This is a 54-year-old female with a history of atrial fibrillation on Eliquis status post ablation 2 months ago, hypothyroidism admitted with lightheadedness in the emergency room patient with blood pressure 90/50, 3 days ago seen in the emergency room for sore throat, fever diarrhea fatigue and generalized weakness was diagnosed with COVID 19 sent home. on 9/12/22. Subjective: Patient is sitting in the bed denies any chest pain or shortness of breath on room air denies any nausea vomiting or diarrhea today appetite is good still lives home alone. Medications:  Reviewed    Infusion Medications    sodium chloride       Scheduled Medications    metoprolol  5 mg IntraVENous Once    metoprolol tartrate  25 mg Oral BID    apixaban  5 mg Oral BID    levothyroxine  125 mcg Oral Daily    sodium chloride flush  5-40 mL IntraVENous 2 times per day    gabapentin  600 mg Oral BID     PRN Meds: sodium chloride flush, sodium chloride, ondansetron **OR** ondansetron, polyethylene glycol, acetaminophen **OR** acetaminophen, hydrALAZINE, melatonin, diphenoxylate-atropine, HYDROcodone 5 mg - acetaminophen      Intake/Output Summary (Last 24 hours) at 9/18/2022 0954  Last data filed at 9/18/2022 0818  Gross per 24 hour   Intake 910 ml   Output 0 ml   Net 910 ml       Physical Exam Performed:    /68   Pulse 57   Temp 97.5 °F (36.4 °C) (Oral)   Resp 18   Ht 5' 6\" (1.676 m)   Wt 150 lb 12.7 oz (68.4 kg)   SpO2 93%   BMI 24.34 kg/m²     General appearance: No apparent distress, appears stated age and cooperative. HEENT: Pupils equal, round, and reactive to light. Conjunctivae/corneas clear. Neck: Supple, with full range of motion. No jugular venous distention. Trachea midline. Respiratory:  Normal respiratory effort.  Clear to auscultation, bilaterally without Rales/Wheezes/Rhonchi. Cardiovascular: Regular rate and rhythm with normal S1/S2 without murmurs, rubs or gallops. Abdomen: Soft, non-tender, non-distended with normal bowel sounds. Musculoskeletal: No clubbing, cyanosis or edema bilaterally. Full range of motion without deformity. Skin: Skin color, texture, turgor normal.  No rashes or lesions. Neurologic:  Neurovascularly intact without any focal sensory/motor deficits. Cranial nerves: II-XII intact, grossly non-focal.  Psychiatric: Alert and oriented, thought content appropriate, normal insight  Capillary Refill: Brisk, 3 seconds, normal   Peripheral Pulses: +2 palpable, equal bilaterally       Labs:   Recent Labs     09/15/22  1559 09/16/22  0445 09/18/22  0558   WBC 7.4 4.2 6.4   HGB 15.5 14.4 14.9   HCT 45.9 43.0 44.9    140 190     Recent Labs     09/15/22  1559 09/16/22  0445 09/18/22  0558    139 138   K 3.6 4.0 4.5    106 108   CO2 21 22 23   BUN 16 14 15   CREATININE 0.9 0.6 0.8   CALCIUM 9.2 8.6 8.8     No results for input(s): AST, ALT, BILIDIR, BILITOT, ALKPHOS in the last 72 hours. No results for input(s): INR in the last 72 hours. Recent Labs     09/15/22  1559   TROPONINI <0.01       Urinalysis:    No results found for: Cathlene Shank, BACTERIA, RBCUA, BLOODU, SPECGRAV, GLUCOSEU    Radiology:  XR CHEST PORTABLE   Final Result   1. Stable chest   2. No active airspace disease              Assessment/Plan:    Active Hospital Problems    Diagnosis     Atrial fibrillation with rapid ventricular response [facility]) [I48.91]      This is a 80-year-old female admitted with a weakness and fatigue tested positive for COVID on 9/12/2022 on room air continue with the supportive care. Consult physical Occupational Therapy discharge home today to follow-up with PCP within 1-2 week.   Paroxysmal atrial fibrillation status post ablation 2 months ago patient is not taking flecainide continue with the Eliquis, Lopressor 25 mg 1 p.o. twice daily, on admission Toprol-XL was held due to low blood pressure patient to follow-up with cardiology as patient had A. fib with RVR of 140 last night at 9 PM currently in normal sinus rhythm. Hypothyroidism continue with the Synthroid. Peripheral neuropathy on gabapentin 600 mg 1 p.o. twice daily. History of lumbar spondylosis /chronic pain syndrome continue with home medication  History of breast cancer status post chemoradiation. Hypotension present on admission resolved with IV hydration continue to monitor. Toprol- mg p.o. daily was held patient was a started on Lopressor 25 mg 1 p.o. twice daily. DVT Prophylaxis: Eliquis  Diet: ADULT DIET;  Regular  ADULT ORAL NUTRITION SUPPLEMENT; Breakfast, Dinner; Standard High Calorie/High Protein Oral Supplement  Code Status: Full Code  PT/OT Eval Status: Ashley Hines MD

## 2022-09-18 NOTE — CARE COORDINATION
SW reached out to RN to follow up on DC recs, RN informed SW Pt already DC home.      Electronically signed by ALEX Ramesh, ALIVIA on 9/18/2022 at 2:07 PM  [phone]

## 2022-09-19 ENCOUNTER — CARE COORDINATION (OUTPATIENT)
Dept: CASE MANAGEMENT | Age: 77
End: 2022-09-19

## 2022-09-19 NOTE — CARE COORDINATION
Date/Time:  9/19/2022 10:54 AM  Attempted to reach patient by telephone. Call within 2 business days of discharge: Yes,  Left HIPPA compliant message requesting a return call. Will attempt to reach patient again.     Thank Sherly Galicia RN  Care Transition Coordinator  [facility]:[phone]

## 2022-09-20 ENCOUNTER — CARE COORDINATION (OUTPATIENT)
Dept: CASE MANAGEMENT | Age: 77
End: 2022-09-20

## 2022-09-24 NOTE — DISCHARGE SUMMARY
Hospital Medicine Discharge Summary    Patient ID: Blaise Hinds      Patient's PCP: No primary care provider on file. Admit Date: 9/15/2022     Discharge Date: 9/18/2022      Admitting Provider: Buffy Arnett MD     Discharge Provider: Oneil Javier MD     Discharge Diagnoses: Active Hospital Problems    Diagnosis     Atrial fibrillation with rapid ventricular response (HCC) [I48.91]        The patient was seen and examined on day of discharge and this discharge summary is in conjunction with any daily progress note from day of discharge. Hospital Course: This is a 49-year-old female admitted with a weakness and fatigue tested positive for COVID on 9/12/2022 on room air continue with the supportive care. Consult physical Occupational Therapy discharge home today to follow-up with PCP within 1-2 week. Paroxysmal atrial fibrillation status post ablation 2 months ago patient is not taking flecainide continue with the Eliquis, Lopressor 25 mg 1 p.o. twice daily, on admission Toprol-XL was held due to low blood pressure patient to follow-up with cardiology as patient had A. fib with RVR of 140 last night at 9 PM currently in normal sinus rhythm. Hypothyroidism continue with the Synthroid. Peripheral neuropathy on gabapentin 600 mg 1 p.o. twice daily. History of lumbar spondylosis /chronic pain syndrome continue with home medication  History of breast cancer status post chemoradiation. Hypotension present on admission resolved with IV hydration continue to monitor. Toprol- mg p.o. daily was held patient was a started on Lopressor 25 mg 1 p.o. twice daily. Physical Exam Performed:     /64   Pulse 59   Temp 97.8 °F (36.6 °C) (Oral)   Resp 20   Ht 5' 6\" (1.676 m)   Wt 150 lb 12.7 oz (68.4 kg)   SpO2 94%   BMI 24.34 kg/m²       General appearance:  No apparent distress, appears stated age and cooperative.   HEENT:  Normal cephalic, atraumatic without obvious deformity. Pupils equal, round, and reactive to light. Extra ocular muscles intact. Conjunctivae/corneas clear. Neck: Supple, with full range of motion. No jugular venous distention. Trachea midline. Respiratory:  Normal respiratory effort. Clear to auscultation, bilaterally without Rales/Wheezes/Rhonchi. Cardiovascular:  Regular rate and rhythm with normal S1/S2 without murmurs, rubs or gallops. Abdomen: Soft, non-tender, non-distended with normal bowel sounds. Musculoskeletal:  No clubbing, cyanosis or edema bilaterally. Full range of motion without deformity. Skin: Skin color, texture, turgor normal.  No rashes or lesions. Neurologic:  Neurovascularly intact without any focal sensory/motor deficits. Cranial nerves: II-XII intact, grossly non-focal.  Psychiatric:  Alert and oriented, thought content appropriate, normal insight  Capillary Refill: Brisk,< 3 seconds   Peripheral Pulses: +2 palpable, equal bilaterally       Labs: For convenience and continuity at follow-up the following most recent labs are provided:      CBC:    Lab Results   Component Value Date/Time    WBC 6.4 09/18/2022 05:58 AM    HGB 14.9 09/18/2022 05:58 AM    HCT 44.9 09/18/2022 05:58 AM     09/18/2022 05:58 AM       Renal:    Lab Results   Component Value Date/Time     09/18/2022 05:58 AM    K 4.5 09/18/2022 05:58 AM     09/18/2022 05:58 AM    CO2 23 09/18/2022 05:58 AM    BUN 15 09/18/2022 05:58 AM    CREATININE 0.8 09/18/2022 05:58 AM    CALCIUM 8.8 09/18/2022 05:58 AM         Significant Diagnostic Studies    Radiology:   XR CHEST PORTABLE   Final Result   1. Stable chest   2. No active airspace disease             Consults:     IP CONSULT TO HOSPITALIST    Disposition:  Home     Condition at Discharge: Stable    Discharge Instructions/Follow-up:  Follow up with pcp in one week.     Code Status:  Full Code    Activity: activity as tolerated    Diet: regular diet      Discharge Medications:     Discharge Medication List as of 9/18/2022  1:07 PM             Details   metoprolol tartrate (LOPRESSOR) 25 MG tablet Take 1 tablet by mouth 2 times daily, Disp-60 tablet, R-3Normal                Details   pantoprazole (PROTONIX) 40 MG tablet Take 40 mg by mouth dailyHistorical Med      apixaban (ELIQUIS) 5 MG TABS tablet Take 1 tablet by mouth 2 times daily, Disp-180 tablet, R-3Normal      levothyroxine (SYNTHROID) 125 MCG tablet Take 1 tablet by mouth Daily, Disp-90 tablet, R-3Normal      gabapentin (NEURONTIN) 300 MG capsule Take 300 mg by mouth 2 times daily. Historical Med             Time Spent on discharge is more than 45 minutes in the examination, evaluation, counseling and review of medications and discharge plan. Signed:    Darlis Lombard, MD   9/24/2022      Thank you No primary care provider on file. for the opportunity to be involved in this patient's care. If you have any questions or concerns, please feel free to contact me at [phone].

## 2022-11-03 ENCOUNTER — OFFICE VISIT (OUTPATIENT)
Dept: PRIMARY CARE CLINIC | Age: 77
End: 2022-11-03
Payer: MEDICARE

## 2022-11-03 VITALS
WEIGHT: 150 LBS | HEART RATE: 69 BPM | OXYGEN SATURATION: 98 % | DIASTOLIC BLOOD PRESSURE: 82 MMHG | BODY MASS INDEX: 24.99 KG/M2 | HEIGHT: 65 IN | SYSTOLIC BLOOD PRESSURE: 127 MMHG | TEMPERATURE: 97.6 F

## 2022-11-03 DIAGNOSIS — E03.9 ACQUIRED HYPOTHYROIDISM: ICD-10-CM

## 2022-11-03 DIAGNOSIS — G62.0 DRUG-INDUCED POLYNEUROPATHY (HCC): Primary | ICD-10-CM

## 2022-11-03 DIAGNOSIS — I48.19 PERSISTENT ATRIAL FIBRILLATION (HCC): ICD-10-CM

## 2022-11-03 PROBLEM — I48.91 ATRIAL FIBRILLATION WITH RAPID VENTRICULAR RESPONSE (HCC): Status: RESOLVED | Noted: 2020-10-07 | Resolved: 2022-11-03

## 2022-11-03 PROCEDURE — 99203 OFFICE O/P NEW LOW 30 MIN: CPT | Performed by: FAMILY MEDICINE

## 2022-11-03 PROCEDURE — G8400 PT W/DXA NO RESULTS DOC: HCPCS | Performed by: FAMILY MEDICINE

## 2022-11-03 PROCEDURE — G8484 FLU IMMUNIZE NO ADMIN: HCPCS | Performed by: FAMILY MEDICINE

## 2022-11-03 PROCEDURE — G8420 CALC BMI NORM PARAMETERS: HCPCS | Performed by: FAMILY MEDICINE

## 2022-11-03 PROCEDURE — 1036F TOBACCO NON-USER: CPT | Performed by: FAMILY MEDICINE

## 2022-11-03 PROCEDURE — G8427 DOCREV CUR MEDS BY ELIG CLIN: HCPCS | Performed by: FAMILY MEDICINE

## 2022-11-03 PROCEDURE — 1090F PRES/ABSN URINE INCON ASSESS: CPT | Performed by: FAMILY MEDICINE

## 2022-11-03 PROCEDURE — 1123F ACP DISCUSS/DSCN MKR DOCD: CPT | Performed by: FAMILY MEDICINE

## 2022-11-03 RX ORDER — GABAPENTIN 300 MG/1
600 CAPSULE ORAL 2 TIMES DAILY
Qty: 360 CAPSULE | Refills: 1 | Status: SHIPPED | OUTPATIENT
Start: 2022-11-03 | End: 2023-02-01

## 2022-11-03 RX ORDER — GABAPENTIN 300 MG/1
600 CAPSULE ORAL 2 TIMES DAILY
COMMUNITY
End: 2022-11-03 | Stop reason: SDUPTHER

## 2022-11-03 RX ORDER — GABAPENTIN 300 MG/1
600 CAPSULE ORAL 2 TIMES DAILY
Qty: 120 CAPSULE | Refills: 0 | Status: SHIPPED | OUTPATIENT
Start: 2022-11-03 | End: 2022-11-03 | Stop reason: SDUPTHER

## 2022-11-03 SDOH — ECONOMIC STABILITY: FOOD INSECURITY: WITHIN THE PAST 12 MONTHS, THE FOOD YOU BOUGHT JUST DIDN'T LAST AND YOU DIDN'T HAVE MONEY TO GET MORE.: NEVER TRUE

## 2022-11-03 SDOH — ECONOMIC STABILITY: FOOD INSECURITY: WITHIN THE PAST 12 MONTHS, YOU WORRIED THAT YOUR FOOD WOULD RUN OUT BEFORE YOU GOT MONEY TO BUY MORE.: NEVER TRUE

## 2022-11-03 ASSESSMENT — PATIENT HEALTH QUESTIONNAIRE - PHQ9
1. LITTLE INTEREST OR PLEASURE IN DOING THINGS: 0
SUM OF ALL RESPONSES TO PHQ QUESTIONS 1-9: 0
SUM OF ALL RESPONSES TO PHQ QUESTIONS 1-9: 0
SUM OF ALL RESPONSES TO PHQ9 QUESTIONS 1 & 2: 0
2. FEELING DOWN, DEPRESSED OR HOPELESS: 0
SUM OF ALL RESPONSES TO PHQ QUESTIONS 1-9: 0
SUM OF ALL RESPONSES TO PHQ QUESTIONS 1-9: 0

## 2022-11-03 ASSESSMENT — SOCIAL DETERMINANTS OF HEALTH (SDOH): HOW HARD IS IT FOR YOU TO PAY FOR THE VERY BASICS LIKE FOOD, HOUSING, MEDICAL CARE, AND HEATING?: NOT HARD AT ALL

## 2022-11-03 NOTE — PROGRESS NOTES
[address] PRIMARY CARE  [address] [place] [address]  Dept: [phone]  Dept Fax: [phone]     11/3/2022      Samantha Bal   1945     Chief Complaint   Patient presents with    New Patient    Medication Refill       HPI    Pt comes in today for new patient visit. Had a disagreement with last PCP so is here to establish care. Per Note on 10/27/22:    Patient was upset about needing an appt for gabapentin. Patient last seen in April. patient threatened to come to the office with a gun    Patient was ultimately dismissed from that office which is why she is transitioning care today. States she has a h/o polyneuropathy 2/2 chemotherapy for her breast cancer. Medication works very well for her. Never had a colonoscopy. Declines any screening. Does not do mammograms any longer. Follows with Cardiologist for h/o paroxysmal afib. Dr. Candido Greenfield. Declines bone density test.     Hypothyroidism. Does not follow with Endo. Last TSH normal in May. Prior to Visit Medications    Medication Sig Taking? Authorizing Provider   metoprolol tartrate (LOPRESSOR) 25 MG tablet Take 1 tablet by mouth 2 times daily  Damion Mcconnell MD   pantoprazole (PROTONIX) 40 MG tablet Take 40 mg by mouth daily  Historical Provider, MD   flecainide (TAMBOCOR) 100 MG tablet Take 1 tablet by mouth 2 times daily  Patient not taking: Reported on 9/15/2022  EVAN Barnard CNP   apixaban (ELIQUIS) 5 MG TABS tablet Take 1 tablet by mouth 2 times daily  EVAN Barnard CNP   levothyroxine (SYNTHROID) 125 MCG tablet Take 1 tablet by mouth Daily  EVAN Barnard CNP   gabapentin (NEURONTIN) 300 MG capsule Take 300 mg by mouth 2 times daily.   Historical Provider, MD   metoprolol succinate (TOPROL XL) 50 MG extended release tablet Take 2 tablets by mouth daily  Dinorah Bae MD        Past Medical History:   Diagnosis Date    Atrial fibrillation ([facility]     Breast cancer ([facility] 2000    Right breast CA - Her2/fred +, s/p lumpectomy with chemo/radiation    Hypothyroidism     Neuropathy         Social History     Tobacco Use    Smoking status: Never    Smokeless tobacco: Never   Substance Use Topics    Alcohol use: Not Currently    Drug use: Not Currently     Types: Marijuana Seabron Barban)        Past Surgical History:   Procedure Laterality Date    INCISIONAL BREAST BIOPSY      OTHER SURGICAL HISTORY      cardiac ablation    PELVIC LAPAROSCOPY      TONSILLECTOMY          No Known Allergies     Family History   Adopted: Yes        Patient's past medical history, surgical history, family history, medications, and allergies  were all reviewed and updated as appropriate today. Review of Systems   Constitutional:  Negative for fever. Respiratory:  Negative for cough. Cardiovascular:  Negative for chest pain. /82   Pulse 69   Temp 97.6 °F (36.4 °C)   Ht 5' 5\" (1.651 m)   Wt 150 lb (68 kg)   SpO2 98%   BMI 24.96 kg/m²      Physical Exam  Constitutional:       General: She is not in acute distress. Appearance: Normal appearance. She is not ill-appearing. Cardiovascular:      Rate and Rhythm: Normal rate. Pulmonary:      Effort: Pulmonary effort is normal.   Neurological:      General: No focal deficit present. Mental Status: She is alert. Psychiatric:         Mood and Affect: Mood normal.       Assessment:  Encounter Diagnoses   Name Primary? Drug-induced polyneuropathy (HCC) Yes    Acquired hypothyroidism     Persistent atrial fibrillation ([facility]        Plan:    - Continue gabapentin at current dose. Prior records reviewed after visit which is where documented h/o threatening behavior was noted. I did have a very clear discussion with patient re: refills of her current medications. She has been stable on this medication for a long time.  Anticipate seeing her once yearly with the next visit being [address] [place] [address] in 6 months.   - TFTs stable. - PAF: Continue follow up with Cardiology. - Declines any other screening tests at this time. New Prescriptions    No medications on file        No orders of the defined types were placed in this encounter. Return in about 6 months (around 5/3/2023) for [address] [place] [address].     Total time spent with patient including direct consultation, evaluation, review of medical records and documentation = 640 Esteban Tamayo, DO

## 2022-11-08 ASSESSMENT — ENCOUNTER SYMPTOMS: COUGH: 0

## 2022-11-16 RX ORDER — LEVOTHYROXINE SODIUM 0.12 MG/1
125 TABLET ORAL DAILY
Qty: 90 TABLET | Refills: 3 | Status: SHIPPED | OUTPATIENT
Start: 2022-11-16

## 2022-11-16 NOTE — TELEPHONE ENCOUNTER
Pt calling for refill on Levothyroxine to be sent to [id number] [employer] for a 90-day supply in Dr DONNELL LINK [facility] name now    Last ov 11-3-22 [facility], [facility].  No future ov

## 2023-05-08 ENCOUNTER — OFFICE VISIT (OUTPATIENT)
Dept: PRIMARY CARE CLINIC | Age: 78
End: 2023-05-08
Payer: MEDICARE

## 2023-05-08 VITALS
HEART RATE: 59 BPM | SYSTOLIC BLOOD PRESSURE: 111 MMHG | BODY MASS INDEX: 23.14 KG/M2 | DIASTOLIC BLOOD PRESSURE: 72 MMHG | HEIGHT: 66 IN | WEIGHT: 144 LBS | TEMPERATURE: 97.7 F

## 2023-05-08 DIAGNOSIS — I48.19 PERSISTENT ATRIAL FIBRILLATION (HCC): ICD-10-CM

## 2023-05-08 DIAGNOSIS — E03.9 ACQUIRED HYPOTHYROIDISM: ICD-10-CM

## 2023-05-08 DIAGNOSIS — G62.0 DRUG-INDUCED POLYNEUROPATHY (HCC): ICD-10-CM

## 2023-05-08 DIAGNOSIS — Z00.00 INITIAL MEDICARE ANNUAL WELLNESS VISIT: Primary | ICD-10-CM

## 2023-05-08 PROCEDURE — 1123F ACP DISCUSS/DSCN MKR DOCD: CPT | Performed by: FAMILY MEDICINE

## 2023-05-08 PROCEDURE — G0438 PPPS, INITIAL VISIT: HCPCS | Performed by: FAMILY MEDICINE

## 2023-05-08 RX ORDER — GABAPENTIN 300 MG/1
600 CAPSULE ORAL 2 TIMES DAILY
Qty: 360 CAPSULE | Refills: 1 | Status: SHIPPED | OUTPATIENT
Start: 2023-05-08 | End: 2023-08-06

## 2023-05-08 SDOH — ECONOMIC STABILITY: FOOD INSECURITY: WITHIN THE PAST 12 MONTHS, YOU WORRIED THAT YOUR FOOD WOULD RUN OUT BEFORE YOU GOT MONEY TO BUY MORE.: NEVER TRUE

## 2023-05-08 SDOH — ECONOMIC STABILITY: HOUSING INSECURITY
IN THE LAST 12 MONTHS, WAS THERE A TIME WHEN YOU DID NOT HAVE A STEADY PLACE TO SLEEP OR SLEPT IN A SHELTER (INCLUDING NOW)?: NO

## 2023-05-08 SDOH — ECONOMIC STABILITY: INCOME INSECURITY: HOW HARD IS IT FOR YOU TO PAY FOR THE VERY BASICS LIKE FOOD, HOUSING, MEDICAL CARE, AND HEATING?: NOT HARD AT ALL

## 2023-05-08 SDOH — ECONOMIC STABILITY: FOOD INSECURITY: WITHIN THE PAST 12 MONTHS, THE FOOD YOU BOUGHT JUST DIDN'T LAST AND YOU DIDN'T HAVE MONEY TO GET MORE.: NEVER TRUE

## 2023-05-08 ASSESSMENT — PATIENT HEALTH QUESTIONNAIRE - PHQ9
1. LITTLE INTEREST OR PLEASURE IN DOING THINGS: 1
SUM OF ALL RESPONSES TO PHQ9 QUESTIONS 1 & 2: 2
2. FEELING DOWN, DEPRESSED OR HOPELESS: 1
SUM OF ALL RESPONSES TO PHQ QUESTIONS 1-9: 2

## 2023-05-08 ASSESSMENT — LIFESTYLE VARIABLES
HOW MANY STANDARD DRINKS CONTAINING ALCOHOL DO YOU HAVE ON A TYPICAL DAY: PATIENT DOES NOT DRINK
HOW OFTEN DO YOU HAVE A DRINK CONTAINING ALCOHOL: NEVER

## 2023-05-08 NOTE — PROGRESS NOTES
TABS tablet Take 1 tablet by mouth 2 times daily Yes EVAN Luke CNP   flecainide (TAMBOCOR) 100 MG tablet Take 1 tablet by mouth 2 times daily  Patient not taking: Reported on 9/15/2022  EVAN Luke CNP   metoprolol succinate (TOPROL XL) 50 MG extended release tablet Take 2 tablets by mouth daily  MD Tavon Guy (Including outside providers/suppliers regularly involved in providing care):    No care team member to display     Reviewed and updated this visit:  Tobacco  Allergies  Meds  Problems  Med Hx  Surg Hx  Soc Hx  Fam Hx               Marisa Aguilera DO

## 2023-05-08 NOTE — PATIENT INSTRUCTIONS
[facility] Laboratory Locations - No appointment necessary. @ indicates the location is open Saturdays in addition to Monday through Friday. Call your preferred location for test preparation, business hours and other information you need. [employer] accepts [employer]. [facility]     @ [address] 5980 [facility], [address]    Ph: 28 [facility] [place], [address]    Ph: [phone]   @ [address] [place] [address].[facility]    Ph: [place] [address]    Ph: [phone]  @ [address] [place] [address]   Ph: [phone]  @ [address] [place] [address]. [facility], [facility] 429    Ph: [address] 297 [facility], [facility] 19   Ph: [phone]    [place]    @ [facility]. [address] [place].[facility] [address]    Ph: [phone]  [facility]   [facility], [address]   Ph: [place] [facility]. [address]: 311 [facility] Mikey Ritter    Ph: [phone]   [facility]   [address] 2026 [facility]. Mikey Graham   Ph: [address] [place] [address]  [address] [facility] [address]    Ph: [phone]

## 2023-05-16 DIAGNOSIS — E03.9 ACQUIRED HYPOTHYROIDISM: ICD-10-CM

## 2023-05-16 DIAGNOSIS — I48.19 PERSISTENT ATRIAL FIBRILLATION (HCC): ICD-10-CM

## 2023-05-17 LAB
ALBUMIN SERPL-MCNC: 4.2 G/DL (ref 3.4–5)
ALBUMIN/GLOB SERPL: 2 {RATIO} (ref 1.1–2.2)
ALP SERPL-CCNC: 95 U/L (ref 40–129)
ALT SERPL-CCNC: 12 U/L (ref 10–40)
ANION GAP SERPL CALCULATED.3IONS-SCNC: 10 MMOL/L (ref 3–16)
AST SERPL-CCNC: 15 U/L (ref 15–37)
BILIRUB SERPL-MCNC: 0.4 MG/DL (ref 0–1)
BUN SERPL-MCNC: 16 MG/DL (ref 7–20)
CALCIUM SERPL-MCNC: 9.6 MG/DL (ref 8.3–10.6)
CHLORIDE SERPL-SCNC: 104 MMOL/L (ref 99–110)
CO2 SERPL-SCNC: 26 MMOL/L (ref 21–32)
CREAT SERPL-MCNC: 0.6 MG/DL (ref 0.6–1.2)
GFR SERPLBLD CREATININE-BSD FMLA CKD-EPI: >60 ML/MIN/{1.73_M2}
GLUCOSE SERPL-MCNC: 111 MG/DL (ref 70–99)
POTASSIUM SERPL-SCNC: 4.5 MMOL/L (ref 3.5–5.1)
PROT SERPL-MCNC: 6.3 G/DL (ref 6.4–8.2)
SODIUM SERPL-SCNC: 140 MMOL/L (ref 136–145)
TSH SERPL DL<=0.005 MIU/L-ACNC: 4.79 UIU/ML (ref 0.27–4.2)

## 2023-10-30 ENCOUNTER — OFFICE VISIT (OUTPATIENT)
Dept: PRIMARY CARE CLINIC | Age: 78
End: 2023-10-30
Payer: MEDICARE

## 2023-10-30 VITALS
HEART RATE: 55 BPM | WEIGHT: 146.6 LBS | BODY MASS INDEX: 24.02 KG/M2 | SYSTOLIC BLOOD PRESSURE: 118 MMHG | OXYGEN SATURATION: 95 % | DIASTOLIC BLOOD PRESSURE: 74 MMHG

## 2023-10-30 DIAGNOSIS — G47.9 DIFFICULTY SLEEPING: Primary | ICD-10-CM

## 2023-10-30 PROCEDURE — 1090F PRES/ABSN URINE INCON ASSESS: CPT | Performed by: FAMILY MEDICINE

## 2023-10-30 PROCEDURE — 99213 OFFICE O/P EST LOW 20 MIN: CPT | Performed by: FAMILY MEDICINE

## 2023-10-30 PROCEDURE — 1123F ACP DISCUSS/DSCN MKR DOCD: CPT | Performed by: FAMILY MEDICINE

## 2023-10-30 PROCEDURE — G8420 CALC BMI NORM PARAMETERS: HCPCS | Performed by: FAMILY MEDICINE

## 2023-10-30 PROCEDURE — G8400 PT W/DXA NO RESULTS DOC: HCPCS | Performed by: FAMILY MEDICINE

## 2023-10-30 PROCEDURE — 1036F TOBACCO NON-USER: CPT | Performed by: FAMILY MEDICINE

## 2023-10-30 PROCEDURE — G8484 FLU IMMUNIZE NO ADMIN: HCPCS | Performed by: FAMILY MEDICINE

## 2023-10-30 PROCEDURE — G8427 DOCREV CUR MEDS BY ELIG CLIN: HCPCS | Performed by: FAMILY MEDICINE

## 2023-10-30 RX ORDER — TRAZODONE HYDROCHLORIDE 50 MG/1
50 TABLET ORAL NIGHTLY
Qty: 30 TABLET | Refills: 2 | Status: SHIPPED | OUTPATIENT
Start: 2023-10-30

## 2023-10-30 NOTE — PROGRESS NOTES
[address] [facility]. PRIMARY CARE  [address] [place] [facility]\"" [address]  Dept: [phone]  Dept Fax: [phone]     10/30/2023      Serina James   1945     Chief Complaint   Patient presents with    Medication Problem     Difficulty sleeping       HPI    Pt comes in today for follow up longstanding chemo induced polyneuropathy. On gabapentin 600 mg BID. Has noticed this also helps     Notes recently she has been experiencing difficulty sleeping. Anxiety in the middle of the night that is waking her up and making it difficult to sleep. Feels fine during the day. Has shifted to taking 1 capsule in the AM and 3 capsules in the evening. Is under the care of her cardiologist for chronic afib. Has no trouble falling asleep at night. Has tried SSRIs in the past with bad side effects. No data recorded     Prior to Visit Medications    Medication Sig Taking? Authorizing Provider   gabapentin (NEURONTIN) 300 MG capsule Take 2 capsules by mouth in the morning and at bedtime for 90 days.   Luisa Guevara,    levothyroxine (SYNTHROID) 125 MCG tablet Take 1 tablet by mouth Daily  Luisa Guevara,    metoprolol tartrate (LOPRESSOR) 25 MG tablet Take 1 tablet by mouth 2 times daily  Emmanuel Ferro MD   flecainide (TAMBOCOR) 100 MG tablet Take 1 tablet by mouth 2 times daily  Patient not taking: Reported on 9/15/2022  EVAN Smith CNP   apixaban (ELIQUIS) 5 MG TABS tablet Take 1 tablet by mouth 2 times daily  EVAN Smiht CNP   metoprolol succinate (TOPROL XL) 50 MG extended release tablet Take 2 tablets by mouth daily  Dontae Bailey MD        Past Medical History:   Diagnosis Date    Atrial fibrillation ([address])     Breast cancer ([address]) 2000    Right breast CA - Her2/fred +, s/p lumpectomy with chemo/radiation    Hypothyroidism     Neuropathy         Social History     Tobacco Use    Smoking status: Never    Smokeless tobacco:

## 2023-11-03 DIAGNOSIS — G62.0 DRUG-INDUCED POLYNEUROPATHY (HCC): ICD-10-CM

## 2023-11-03 NOTE — TELEPHONE ENCOUNTER
Medication:   Requested Prescriptions     Pending Prescriptions Disp Refills    gabapentin (NEURONTIN) 300 MG capsule [Pharmacy Med Name: GABAPENTIN 300 MG Capsule] 360 capsule 10     Sig: TAKE 2 CAPSULES IN THE MORNING AND AT BEDTIME FOR 90 DAYS.    levothyroxine (SYNTHROID) 125 MCG tablet [Pharmacy Med Name: LEVOTHYROXINE SODIUM 125 MCG Tablet] 90 tablet 10     Sig: TAKE 1 TABLET EVERY DAY     Last Filled:  7/6/23    Last appt: 10/30/2023   Next appt: Visit date not found

## 2023-11-06 ENCOUNTER — TELEPHONE (OUTPATIENT)
Dept: PRIMARY CARE CLINIC | Age: 78
End: 2023-11-06

## 2023-11-06 RX ORDER — GABAPENTIN 300 MG/1
CAPSULE ORAL
Qty: 360 CAPSULE | Refills: 1 | Status: SHIPPED | OUTPATIENT
Start: 2023-11-06 | End: 2024-05-04

## 2023-11-06 RX ORDER — LEVOTHYROXINE SODIUM 0.12 MG/1
125 TABLET ORAL DAILY
Qty: 90 TABLET | Refills: 10 | Status: SHIPPED | OUTPATIENT
Start: 2023-11-06

## 2024-05-20 DIAGNOSIS — G62.0 DRUG-INDUCED POLYNEUROPATHY (HCC): ICD-10-CM

## 2024-05-20 NOTE — TELEPHONE ENCOUNTER
Called patient to schedule [facility]  LAST SEEN       NEXT APPOINTMENT       LAST FILL    10.30.23  5.28.24  2.22.24

## 2024-05-21 RX ORDER — GABAPENTIN 300 MG/1
CAPSULE ORAL
Qty: 360 CAPSULE | Refills: 0 | Status: SHIPPED | OUTPATIENT
Start: 2024-05-21 | End: 2024-08-21

## 2024-05-29 ENCOUNTER — OFFICE VISIT (OUTPATIENT)
Dept: PRIMARY CARE CLINIC | Age: 79
End: 2024-05-29
Payer: MEDICARE

## 2024-05-29 VITALS
HEART RATE: 63 BPM | SYSTOLIC BLOOD PRESSURE: 114 MMHG | OXYGEN SATURATION: 94 % | WEIGHT: 155 LBS | DIASTOLIC BLOOD PRESSURE: 73 MMHG | BODY MASS INDEX: 25.4 KG/M2

## 2024-05-29 DIAGNOSIS — Z00.00 MEDICARE ANNUAL WELLNESS VISIT, SUBSEQUENT: Primary | ICD-10-CM

## 2024-05-29 DIAGNOSIS — R26.89 LOSS OF BALANCE: ICD-10-CM

## 2024-05-29 DIAGNOSIS — G62.0 DRUG-INDUCED POLYNEUROPATHY (HCC): ICD-10-CM

## 2024-05-29 DIAGNOSIS — E03.9 ACQUIRED HYPOTHYROIDISM: ICD-10-CM

## 2024-05-29 DIAGNOSIS — I48.19 PERSISTENT ATRIAL FIBRILLATION (HCC): ICD-10-CM

## 2024-05-29 PROCEDURE — G0439 PPPS, SUBSEQ VISIT: HCPCS | Performed by: FAMILY MEDICINE

## 2024-05-29 PROCEDURE — 1123F ACP DISCUSS/DSCN MKR DOCD: CPT | Performed by: FAMILY MEDICINE

## 2024-05-29 SDOH — ECONOMIC STABILITY: FOOD INSECURITY: WITHIN THE PAST 12 MONTHS, YOU WORRIED THAT YOUR FOOD WOULD RUN OUT BEFORE YOU GOT MONEY TO BUY MORE.: NEVER TRUE

## 2024-05-29 SDOH — ECONOMIC STABILITY: INCOME INSECURITY: HOW HARD IS IT FOR YOU TO PAY FOR THE VERY BASICS LIKE FOOD, HOUSING, MEDICAL CARE, AND HEATING?: NOT HARD AT ALL

## 2024-05-29 SDOH — ECONOMIC STABILITY: FOOD INSECURITY: WITHIN THE PAST 12 MONTHS, THE FOOD YOU BOUGHT JUST DIDN'T LAST AND YOU DIDN'T HAVE MONEY TO GET MORE.: NEVER TRUE

## 2024-05-29 ASSESSMENT — PATIENT HEALTH QUESTIONNAIRE - PHQ9
SUM OF ALL RESPONSES TO PHQ QUESTIONS 1-9: 0
2. FEELING DOWN, DEPRESSED OR HOPELESS: NOT AT ALL
SUM OF ALL RESPONSES TO PHQ QUESTIONS 1-9: 0
SUM OF ALL RESPONSES TO PHQ9 QUESTIONS 1 & 2: 0
1. LITTLE INTEREST OR PLEASURE IN DOING THINGS: NOT AT ALL
SUM OF ALL RESPONSES TO PHQ QUESTIONS 1-9: 0
SUM OF ALL RESPONSES TO PHQ QUESTIONS 1-9: 0

## 2024-05-29 NOTE — PATIENT INSTRUCTIONS
[facility] Laboratory Locations - No appointment necessary.  ? indicates the location is open Saturdays in addition to Monday through Friday.   Call your preferred location for test preparation, business hours and other information you need.   [facility] accepts all insurances.  [place]  [place]    ? Mal[place]   [address]   [address]   [place] [address]    Ph: [phone]  [facility]   [address] [place] [address]    Ph: [phone]   ? Adán   [address]    [place] [address]    Ph: [phone]     [facility]   [address] [place] [address]    Ph: [phone] ? [address] [place] [address]   Ph: [phone]  ? [facility]   [address] [place] [address]    Ph: [phone]      [place]   [address] [place] [address]   Ph: [phone]    [place]TH    ? [facility]   [address] [facility] [place] [address]    Ph: [phone]  [facility]   [address] [place] [address]   Ph: [phone]  [place]   [address] [facility], [address]    PH: [phone]    [facility]   [address]    [place] [address]    Ph: [phone]  [place]  [address] [place] [address]  Ph: [phone]  [facility]   [address] [place] [address]    Ph: [phone]

## 2024-05-29 NOTE — PROGRESS NOTES
Safety:  Do you have working smoke detectors?: (!) No  Do you have any tripping hazards - loose or unsecured carpets or rugs?: (!) Yes  Interventions:  Referred to: Physical Therapy (PT)                 Objective   Vitals:    05/29/24 1404   BP: 114/73   Site: Left Upper Arm   Pulse: 63   SpO2: 94%   Weight: 70.3 kg (155 lb)      Body mass index is 25.4 kg/m².              No Known Allergies  Prior to Visit Medications    Medication Sig Taking? Authorizing Provider   gabapentin (NEURONTIN) 300 MG capsule TAKE 2 CAPSULES EVERY MORNING AND EVENING Yes Marisa Guevara DO   levothyroxine (SYNTHROID) 125 MCG tablet TAKE 1 TABLET EVERY DAY Yes Marisa Guevara DO   metoprolol tartrate (LOPRESSOR) 25 MG tablet Take 1 tablet by mouth 2 times daily Yes Marion Nguyen MD   apixaban (ELIQUIS) 5 MG TABS tablet Take 1 tablet by mouth 2 times daily Yes Beth Olson APRN - CNP   flecainide (TAMBOCOR) 100 MG tablet Take 1 tablet by mouth 2 times daily  Patient not taking: Reported on 9/15/2022  Beth Olson APRN - CNP   metoprolol succinate (TOPROL XL) 50 MG extended release tablet Take 2 tablets by mouth daily  Nii Mazariegos MD [facility] (Including outside providers/suppliers regularly involved in providing care):   Patient Care Team:  Marisa Guevara DO as PCP - General (Family Medicine)  Marisa Guevara DO as PCP - Empaneled Provider     Reviewed and updated this visit:  Tobacco  Allergies  Meds  Problems  Med Hx  Surg Hx  Soc Hx  Fam Hx

## 2024-06-14 ENCOUNTER — HOSPITAL ENCOUNTER (OUTPATIENT)
Dept: PHYSICAL THERAPY | Age: 79
Setting detail: THERAPIES SERIES
Discharge: HOME | End: 2024-06-14
Attending: FAMILY MEDICINE
Payer: MEDICARE

## 2024-06-14 DIAGNOSIS — R26.89 BALANCE PROBLEM: Primary | ICD-10-CM

## 2024-06-14 PROCEDURE — 97530 THERAPEUTIC ACTIVITIES: CPT | Performed by: PHYSICAL THERAPIST

## 2024-06-14 PROCEDURE — 97161 PT EVAL LOW COMPLEX 20 MIN: CPT | Performed by: PHYSICAL THERAPIST

## 2024-06-14 NOTE — PLAN OF CARE
[facility]- Outpatient Rehabilitation and Therapy   [address], [place] [address]   office: [phone] fax: [phone]     Physical Therapy Initial Evaluation Certification      Dear Marisa Guevara*,    We had the pleasure of evaluating the following patient for physical therapy services at [facility].  A summary of our findings can be found in the initial assessment below.  This includes our plan of care.  If you have any questions or concerns regarding these findings, please do not hesitate to contact me at the office phone number listed above.  Thank you for the referral.     Physician Signature:_______________________________Date:__________________  By signing above (or electronic signature), therapist’s plan is approved by physician       Physical Therapy: TREATMENT/PROGRESS NOTE   Patient: Debbie Shah (79 y.o. female)   Examination Date: 2024   :  1945 MRN: [id number]   Visit #: 1   Insurance Allowable Auth Needed   Medical Necessity [x]Yes    []No    Insurance: Payor: HUMANA MEDICARE / Plan: HUMANA GOLD PLUS HMO / Product Type: *No Product type* /   Insurance ID: [id number] - (Medicare Managed)  Secondary Insurance (if applicable):    Treatment Diagnosis:     ICD-10-CM    1. Balance problem  R26.89          Medical Diagnosis:  Loss of balance [R26.89]   Referring Physician: Marisa Guevara*  PCP: Marisa Guevara DO     Plan of care signed (Y/N):     Date of Patient follow up with Physician:      Progress Report/POC: EVAL today  POC update due: (10 visits /OR AUTH LIMITS, whichever is less)  2024                                             Precautions/ Contra-indications:           Latex allergy:  NO  Pacemaker:    NO  Contraindications for Manipulation: NA  Date of Surgery: NA  Other:    Red Flags:  None    C-SSRS Triggered by Intake questionnaire:   Patient answered 'NO' to both behavioral

## 2024-06-19 ENCOUNTER — HOSPITAL ENCOUNTER (OUTPATIENT)
Dept: PHYSICAL THERAPY | Age: 79
Setting detail: THERAPIES SERIES
Discharge: HOME | End: 2024-06-19
Attending: FAMILY MEDICINE
Payer: MEDICARE

## 2024-06-19 DIAGNOSIS — E03.9 ACQUIRED HYPOTHYROIDISM: ICD-10-CM

## 2024-06-19 DIAGNOSIS — I48.19 PERSISTENT ATRIAL FIBRILLATION (HCC): ICD-10-CM

## 2024-06-19 PROCEDURE — 97110 THERAPEUTIC EXERCISES: CPT | Performed by: PHYSICAL THERAPIST

## 2024-06-19 PROCEDURE — 97112 NEUROMUSCULAR REEDUCATION: CPT | Performed by: PHYSICAL THERAPIST

## 2024-06-19 NOTE — FLOWSHEET NOTE
[facility]- Outpatient Rehabilitation and Therapy   [address], [place] [address]   office: [phone] fax: [phone]    Physical Therapy: TREATMENT/PROGRESS NOTE   Patient: Debbie Shah (79 y.o. female)   Examination Date: 2024   :  1945 MRN: [id number]   Visit #: 2   Insurance Allowable Auth Needed   Medical Necessity [x]Yes    []No    Insurance: Payor: HUMANA MEDICARE / Plan: HUMANA GOLD PLUS HMO / Product Type: *No Product type* /   Insurance ID: [id number] - (Medicare Managed)  Secondary Insurance (if applicable):    Treatment Diagnosis:     ICD-10-CM    1. Balance problem  R26.89          Medical Diagnosis:  Loss of balance [R26.89]   Referring Physician: Marisa Guevara*  PCP: Marisa Guevara DO     Plan of care signed (Y/N):     Date of Patient follow up with Physician:      Progress Report/POC: EVAL today  POC update due: (10 visits /OR AUTH LIMITS, whichever is less)  2024                                             Precautions/ Contra-indications:           Latex allergy:  NO  Pacemaker:    NO  Contraindications for Manipulation: NA  Date of Surgery: NA  Other:    Red Flags:  None    C-SSRS Triggered by Intake questionnaire:   Patient answered 'NO' to both behavioral questions on intake.  No further screening warranted    Preferred Language for Healthcare:  English    SUBJECTIVE EXAMINATION     Patient stated complaint/comments: Pt reports that she is feeling about the same as last visit.        Test used Initial score  2024   Pain Summary VAS 2/10    Functional questionnaire ABC Scale 71% avg confidence - 29% impairment    Other:                Exercises/Interventions     Observation: see above    Therapeutic Ex (23344)  Resistance Sets/time Reps Notes/Cues/Progressions   NuStep Seat 7 L4 5 min 373 steps    SKTC  10\" hold 5 x ea    Piriformis  10\" hold 5 x ea    ADD Stretch  10\" hold 5 x ea    Hip ADD Squeeze

## 2024-06-20 LAB
ALBUMIN SERPL-MCNC: 4.2 G/DL (ref 3.4–5)
ALBUMIN/GLOB SERPL: 2 {RATIO} (ref 1.1–2.2)
ALP SERPL-CCNC: 109 U/L (ref 40–129)
ALT SERPL-CCNC: 12 U/L (ref 10–40)
ANION GAP SERPL CALCULATED.3IONS-SCNC: 11 MMOL/L (ref 3–16)
AST SERPL-CCNC: 16 U/L (ref 15–37)
BILIRUB SERPL-MCNC: 0.3 MG/DL (ref 0–1)
BUN SERPL-MCNC: 16 MG/DL (ref 7–20)
CALCIUM SERPL-MCNC: 9.7 MG/DL (ref 8.3–10.6)
CHLORIDE SERPL-SCNC: 103 MMOL/L (ref 99–110)
CO2 SERPL-SCNC: 25 MMOL/L (ref 21–32)
CREAT SERPL-MCNC: 0.7 MG/DL (ref 0.6–1.2)
GFR SERPLBLD CREATININE-BSD FMLA CKD-EPI: 88 ML/MIN/{1.73_M2}
GLUCOSE SERPL-MCNC: 90 MG/DL (ref 70–99)
POTASSIUM SERPL-SCNC: 4 MMOL/L (ref 3.5–5.1)
PROT SERPL-MCNC: 6.3 G/DL (ref 6.4–8.2)
SODIUM SERPL-SCNC: 139 MMOL/L (ref 136–145)
TSH SERPL DL<=0.005 MIU/L-ACNC: 0.14 UIU/ML (ref 0.27–4.2)

## 2024-06-28 ENCOUNTER — HOSPITAL ENCOUNTER (OUTPATIENT)
Dept: PHYSICAL THERAPY | Age: 79
Setting detail: THERAPIES SERIES
Discharge: HOME | End: 2024-06-28
Attending: FAMILY MEDICINE
Payer: MEDICARE

## 2024-06-28 PROCEDURE — 97110 THERAPEUTIC EXERCISES: CPT | Performed by: PHYSICAL THERAPIST

## 2024-06-28 PROCEDURE — 97112 NEUROMUSCULAR REEDUCATION: CPT | Performed by: PHYSICAL THERAPIST

## 2024-06-28 NOTE — FLOWSHEET NOTE
Addended by: Zachariah Lindsey on: 8/15/2018 04:01 PM     Modules accepted: Orders
would benefit from continued outpatient therapy services to address the deficits outlined in the patients goals  The patient has associated co-morbidities along with primary diagnosis which significantly impact the rate of recovery and contribute to complexities that require skilled therapeutic intervention    Prognosis for POC: [x] Good [] Fair  [] Poor    Patient requires continued skilled intervention: [x] Yes  [] No    CHARGE CAPTURE     PT CHARGE GRID   CPT Code (TIMED) minutes # CPT Code (UNTIMED) #     Therex (77146)  31 min 2  EVAL:LOW (85788 - Typically 20 minutes face-to-face)     Neuromusc. Re-ed (05863) 25 min 2  Re-Eval (81970)     Manual (69035)    Estim Unattended (20959)     Ther. Act (23833)    Mech. Traction (30487)     Gait (27689)    Dry Needle 1-2 muscle (20560)     Aquatic Therex (64204)    Dry Needle 3+ muscle (20561)     Iontophoresis (16790)    VASO (51741)     Ultrasound (17152)    Group Therapy (18343)     Estim Attended (50410)    Canalith Repositioning (11607)     Other:    Other:    Total Timed Code Tx Minutes 56 min 4       Total Treatment Minutes 56 min        Charge Justification:  (35773) THERAPEUTIC EXERCISE - Provided verbal/tactile cueing for activities related to strengthening, flexibility, endurance, ROM performed to prevent loss of range of motion, maintain or improve muscular strength or increase flexibility, following either an injury or surgery.   (08291) HOME EXERCISE PROGRAM - Reviewed/Progressed HEP activities related to strengthening, flexibility, endurance, ROM performed to prevent loss of range of motion, maintain or improve muscular strength or increase flexibility, following either an injury or surgery.  (84949) NEUROMUSCULAR RE-EDUCATION - Therapeutic procedure, 1 or more areas, each 15 minutes; neuromuscular reeducation of movement, balance, coordination, kinesthetic sense, posture, and/or proprioception for sitting and/or standing activities      GOALS

## 2024-07-01 NOTE — PROGRESS NOTES
[facility]   Cardiac Electrophysiology Consultation   Date: 7/9/2024  Reason for Consultation:   Consult Requesting Physician: No att. providers found     Chief Complaint:   Chief Complaint   Patient presents with    Follow-up      HPI: Debbie Shah is a 79 y.o. female with PMH significant for hypothyroidism, chemo induced peripheral neuropathy and chronic pain syndrome on medical marijuana, following LHD/syncopal episode, found to be in AF/RVR (10/2020), started on metoprolol and Eliquis, 2 day CAM (12/2020) showed SB avg HR 57 () with AF burden 14%, 4 episodes AT, longest lasting 18 beats, recommended an OV with UL to discuss starting flecainide, but pt wished for a second opinion.    She consulted EP at [facility]. At that time, her AF episodes were infrequent, but increased in frequency/duration, placed on amiodarone, s/p cryoablation (7/20/22, Dr. Huerta), amio stopped, recurrent AF RVR in the setting of COVID, she took additional metoprolol and became very hypotensive, recommendation for repeat ablation but not done (1/2023) as AF episodes eventually improved.    Interval History:   Today, she is here to re-establish care with EP at [facility]. ECG today shows SR. She uses Kardia to monitor her rhythm and reports episodes of AF with rate up to 150 that last about 8-10 hrs. With AF, she feels palpitations described as vibrations. After she converted to SR (per Kardia), she felt better. For the past week, she had decreased her levothyroxine and eliminated caffeine and ibuprofen. Since these changes, she has been feeling better with no episodes of AF.    Assessment:  PAF, on Eliquis, Lopressor  S/p cryoablation (7/2022, [facility])  Hypothyroidism, TSH 0.14 (6/2024), on levothyroxine  Peripheral neuropathy    Plan:  Her episodes of AF are infrequent, but longer in duration. Hence, it is reasonable to start flecainide 100 mg BID as pill in the pocket  Continue metoprolol 25 mg BID and Eliquis 5

## 2024-07-09 ENCOUNTER — OFFICE VISIT (OUTPATIENT)
Dept: CARDIOLOGY CLINIC | Age: 79
End: 2024-07-09
Payer: MEDICARE

## 2024-07-09 VITALS
SYSTOLIC BLOOD PRESSURE: 102 MMHG | HEART RATE: 61 BPM | BODY MASS INDEX: 25.24 KG/M2 | DIASTOLIC BLOOD PRESSURE: 84 MMHG | WEIGHT: 154 LBS

## 2024-07-09 DIAGNOSIS — I48.0 PAF (PAROXYSMAL ATRIAL FIBRILLATION) (HCC): Primary | ICD-10-CM

## 2024-07-09 DIAGNOSIS — E03.9 ACQUIRED HYPOTHYROIDISM: ICD-10-CM

## 2024-07-09 PROCEDURE — 1036F TOBACCO NON-USER: CPT | Performed by: INTERNAL MEDICINE

## 2024-07-09 PROCEDURE — G8427 DOCREV CUR MEDS BY ELIG CLIN: HCPCS | Performed by: INTERNAL MEDICINE

## 2024-07-09 PROCEDURE — 1090F PRES/ABSN URINE INCON ASSESS: CPT | Performed by: INTERNAL MEDICINE

## 2024-07-09 PROCEDURE — G8419 CALC BMI OUT NRM PARAM NOF/U: HCPCS | Performed by: INTERNAL MEDICINE

## 2024-07-09 PROCEDURE — 99214 OFFICE O/P EST MOD 30 MIN: CPT | Performed by: INTERNAL MEDICINE

## 2024-07-09 PROCEDURE — 1123F ACP DISCUSS/DSCN MKR DOCD: CPT | Performed by: INTERNAL MEDICINE

## 2024-07-09 PROCEDURE — G8400 PT W/DXA NO RESULTS DOC: HCPCS | Performed by: INTERNAL MEDICINE

## 2024-07-09 RX ORDER — FLECAINIDE ACETATE 100 MG/1
100 TABLET ORAL 2 TIMES DAILY PRN
Qty: 30 TABLET | Refills: 5 | Status: SHIPPED | OUTPATIENT
Start: 2024-07-09

## 2024-07-09 NOTE — PATIENT INSTRUCTIONS
heart medications such as amiodarone, diltiazem, disopyramide, nifedipine, quinidine, or verapamil; or  seizure medication.  This list is not complete. Other drugs may affect flecainide, including prescription and over-the-counter medicines, vitamins, and herbal products. Not all possible drug interactions are listed here.  Where can I get more information?  Your pharmacist can provide more information about flecainide.  Remember, keep this and all other medicines out of the reach of children, never share your medicines with others, and use this medication only for the indication prescribed.   Every effort has been made to ensure that the information provided by [employer]. [employer] is accurate, up-to-date, and complete, but no guarantee is made to that effect. Drug information contained herein may be time sensitive. [employer] information has been compiled for use by healthcare practitioners and consumers in the [place] and therefore [employer] does not warrant that uses outside of the [place] are appropriate, unless specifically indicated otherwise. [employer]'s drug information does not endorse drugs, diagnose patients or recommend therapy. [employer] drug information is an informational resource designed to assist licensed healthcare practitioners in caring for their patients and/or to serve consumers viewing this service as a supplement to, and not a substitute for, the expertise, skill, knowledge and judgment of healthcare practitioners. The absence of a warning for a given drug or drug combination in no way should be construed to indicate that the drug or drug combination is safe, effective or appropriate for any given patient. [employer] does not assume any responsibility for any aspect of healthcare administered with the aid of information [employer] provides. The information contained herein is not intended to cover all possible uses, directions, precautions, warnings, drug interactions, allergic

## 2024-07-11 ENCOUNTER — APPOINTMENT (OUTPATIENT)
Dept: PHYSICAL THERAPY | Age: 79
End: 2024-07-11
Attending: FAMILY MEDICINE
Payer: MEDICARE

## 2024-07-12 ENCOUNTER — HOSPITAL ENCOUNTER (OUTPATIENT)
Dept: PHYSICAL THERAPY | Age: 79
Setting detail: THERAPIES SERIES
Discharge: HOME | End: 2024-07-12
Attending: FAMILY MEDICINE
Payer: MEDICARE

## 2024-07-12 PROCEDURE — 97110 THERAPEUTIC EXERCISES: CPT | Performed by: PHYSICAL THERAPIST

## 2024-07-12 NOTE — PLAN OF CARE
[facility]- Outpatient Rehabilitation and Therapy   [address], [place] [address]   office: [phone] fax: [phone]    Physical Therapy: TREATMENT/PROGRESS NOTE   Patient: Debbie Shah (79 y.o. female)   Examination Date: 2024   :  1945 MRN: [id number]   Visit #: 4   Insurance Allowable Auth Needed   Medical Necessity [x]Yes    []No    Insurance: Payor: HUMANA MEDICARE / Plan: HUMANA GOLD PLUS HMO / Product Type: *No Product type* /   Insurance ID: [id number] - (Medicare Managed)  Secondary Insurance (if applicable):    Treatment Diagnosis:     ICD-10-CM    1. Balance problem  R26.89          Medical Diagnosis:  Loss of balance [R26.89]   Referring Physician: Marisa Guevara*  PCP: Marisa Guevara DO     Plan of care signed (Y/N):     Date of Patient follow up with Physician:      Progress Report/POC: YES, Date Range for this report: 2024 to 2024  POC update due: (10 visits /OR AUTH LIMITS, whichever is less)  2024                                             Precautions/ Contra-indications:           Latex allergy:  NO  Pacemaker:    NO  Contraindications for Manipulation: NA  Date of Surgery: NA  Other:    Red Flags:  None    C-SSRS Triggered by Intake questionnaire:   Patient answered 'NO' to both behavioral questions on intake.  No further screening warranted    Preferred Language for Healthcare:  English    SUBJECTIVE EXAMINATION     Patient stated complaint/comments:  Pt reports biggest limitation is leg strength/endurance in quads specifically.  Reports that she feels weak with riding her bike especially.        Test used Initial score  2024   Pain Summary VAS 2/10 0/10 - \"mild soreness\"   Functional questionnaire ABC Scale 71% avg confidence - 29% impairment    Other:                Exercises/Interventions     Observation: see above    Therapeutic Ex (32049)  Resistance Sets/time Reps

## 2024-07-15 ENCOUNTER — HOSPITAL ENCOUNTER (OUTPATIENT)
Dept: PHYSICAL THERAPY | Age: 79
Setting detail: THERAPIES SERIES
Discharge: HOME | End: 2024-07-15
Attending: FAMILY MEDICINE
Payer: MEDICARE

## 2024-07-15 PROCEDURE — 97110 THERAPEUTIC EXERCISES: CPT | Performed by: PHYSICAL THERAPIST

## 2024-07-15 PROCEDURE — 97112 NEUROMUSCULAR REEDUCATION: CPT | Performed by: PHYSICAL THERAPIST

## 2024-07-15 NOTE — FLOWSHEET NOTE
[facility]- Outpatient Rehabilitation and Therapy   [address], [place] [address]   office: [phone] fax: [phone]    Physical Therapy: TREATMENT/PROGRESS NOTE   Patient: Debbie Shah (79 y.o. female)   Examination Date: 07/15/2024   :  1945 MRN: [id number]   Visit #: 5   Insurance Allowable Auth Needed   Medical Necessity [x]Yes    []No    Insurance: Payor: HUMANA MEDICARE / Plan: HUMANA GOLD PLUS HMO / Product Type: *No Product type* /   Insurance ID: [id number] - (Medicare Managed)  Secondary Insurance (if applicable):    Treatment Diagnosis:     ICD-10-CM    1. Balance problem  R26.89          Medical Diagnosis:  Loss of balance [R26.89]   Referring Physician: Marisa Guevara*  PCP: Marisa Guevara DO     Plan of care signed (Y/N):     Date of Patient follow up with Physician:      Progress Report/POC: NO  POC update due: (10 visits /OR AUTH LIMITS, whichever is less)  2024                                            Precautions/ Contra-indications:           Latex allergy:  NO  Pacemaker:    NO  Contraindications for Manipulation: NA  Date of Surgery: NA  Other:    Red Flags:  None    C-SSRS Triggered by Intake questionnaire:   Patient answered 'NO' to both behavioral questions on intake.  No further screening warranted    Preferred Language for Healthcare:  English    SUBJECTIVE EXAMINATION     Patient stated complaint/comments:  Pt reports that she is working on her hip strengthening exercises.       Test used Initial score  6/14/24 07/15/2024   Pain Summary VAS 2/10 0/10    Functional questionnaire ABC Scale 71% avg confidence - 29% impairment    Other:                Exercises/Interventions     Observation: see above    Therapeutic Ex (16236)  Resistance Sets/time Reps Notes/Cues/Progressions   NuStep Seat 7 L4      Recumbent Bike, Seat 7 L 4.0 6 min  Pt reports resistance is too easy at level 2, able to tolerate increased

## 2024-07-18 ENCOUNTER — HOSPITAL ENCOUNTER (OUTPATIENT)
Dept: PHYSICAL THERAPY | Age: 79
Setting detail: THERAPIES SERIES
Discharge: HOME | End: 2024-07-18
Attending: FAMILY MEDICINE
Payer: MEDICARE

## 2024-07-18 NOTE — PLAN OF CARE
[facility]- Outpatient Rehabilitation and Therapy   [address], [place] [address]   office: [phone] fax: [phone]    Physical Therapy: TREATMENT/PROGRESS NOTE   Patient: Debbie Shah (79 y.o. female)   Examination Date: 2024   :  1945 MRN: [id number]   Visit #: 6   Insurance Allowable Auth Needed   Medical Necessity [x]Yes    []No    Insurance: Payor: HUMANA MEDICARE / Plan: HUMANA GOLD PLUS HMO / Product Type: *No Product type* /   Insurance ID: [id number] - (Medicare Managed)  Secondary Insurance (if applicable):    Treatment Diagnosis:     ICD-10-CM    1. Balance problem  R26.89          Medical Diagnosis:  Loss of balance [R26.89]   Referring Physician: Marisa Guevara*  PCP: Marisa Guevara DO     Plan of care signed (Y/N):     Date of Patient follow up with Physician:      Progress Report/POC: NO  POC update due: (10 visits /OR AUTH LIMITS, whichever is less)  2024                                            Precautions/ Contra-indications:           Latex allergy:  NO  Pacemaker:    NO  Contraindications for Manipulation: NA  Date of Surgery: NA  Other:    Red Flags:  None    C-SSRS Triggered by Intake questionnaire:   Patient answered 'NO' to both behavioral questions on intake.  No further screening warranted    Preferred Language for Healthcare:  English    SUBJECTIVE EXAMINATION     Patient stated complaint/comments:  Pt reports that she is working on her hip strengthening exercises.       Test used Initial score  2024   Pain Summary VAS 2/10 0/10    Functional questionnaire ABC Scale 71% avg confidence - 29% impairment 80% avg confidence = 20% impairment   Other: CTSIB EO Foam: 30% impairment EO Foam: 7% impairment             Exercises/Interventions     Observation: see above    Therapeutic Ex (03481)  Resistance Sets/time Reps Notes/Cues/Progressions   NuStep Seat 7 L4      Recumbent Bike, Seat 7 L 4.5 7 min

## 2024-07-25 ENCOUNTER — TELEPHONE (OUTPATIENT)
Dept: CARDIOLOGY CLINIC | Age: 79
End: 2024-07-25

## 2024-07-25 NOTE — TELEPHONE ENCOUNTER
Pt reports that she had an episode of AF on Sun that lasted about 12 hrs, resolved after single dose of flecainide. She had another episode of AF that started last night at 7 pm. She felt her heart beating faster and checked her rhythm with Kardia, confirmed AF. HR , up to 120 with activities. She feels tired, but denies SOB, CP, dizziness. She has taken 3 doses of flecainide since last night, most recent at 3 pm, and still remains in AF.    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 As she is not terribly symptomatic, she wants to wait until Monday, hoping by then then she will be back to normal. She denies missing any doses of Eliquis. Advised to continue taking the flecainide 100 mg BID, Eliquis and metoprolol over the weekend. Advised to go to the ED if she develops SOB, CP, or HR persisting > 120-130.    She will call our office Monday morning with an update. If she remains in AF Mon when she wakes up, she will remain NPO (except meds) just in case a DCCV is indicated. Advised to call first thing Mon am with update. Pt verbalized understanding.

## 2024-07-25 NOTE — TELEPHONE ENCOUNTER
Pt states that she was instructed to take Flecanide if she has an Afib Episode. She was in afib for 12 hours from Sunday until Monday. She is currently still in it and has been for over 20 hours. She wants to speak with someone regarding this. She can be reached at [phone]

## 2024-07-25 NOTE — TELEPHONE ENCOUNTER
I agree with the plan.  Have her continue the flecainide while she is in atrial fibrillation.  Have her remain n.p.o. Monday morning and come to the office for an EKG if she feels like she is still out of rhythm and we will arrange for her to have a cardioversion.  She has not missed any doses of oral anticoagulation correct?

## 2024-07-26 ENCOUNTER — APPOINTMENT (OUTPATIENT)
Dept: GENERAL RADIOLOGY | Age: 79
End: 2024-07-26
Payer: MEDICARE

## 2024-07-26 ENCOUNTER — HOSPITAL ENCOUNTER (EMERGENCY)
Age: 79
Discharge: HOME | End: 2024-07-26
Attending: EMERGENCY MEDICINE
Payer: MEDICARE

## 2024-07-26 VITALS
WEIGHT: 156.8 LBS | RESPIRATION RATE: 16 BRPM | HEART RATE: 53 BPM | SYSTOLIC BLOOD PRESSURE: 112 MMHG | HEIGHT: 66 IN | OXYGEN SATURATION: 99 % | BODY MASS INDEX: 25.2 KG/M2 | TEMPERATURE: 98.3 F | DIASTOLIC BLOOD PRESSURE: 71 MMHG

## 2024-07-26 DIAGNOSIS — R42 LIGHTHEADEDNESS: Primary | ICD-10-CM

## 2024-07-26 LAB
ANION GAP SERPL CALCULATED.3IONS-SCNC: 11 MMOL/L (ref 3–16)
BASOPHILS # BLD: 0.1 K/UL (ref 0–0.2)
BASOPHILS NFR BLD: 0.6 %
BUN SERPL-MCNC: 13 MG/DL (ref 7–20)
CALCIUM SERPL-MCNC: 8.7 MG/DL (ref 8.3–10.6)
CHLORIDE SERPL-SCNC: 108 MMOL/L (ref 99–110)
CO2 SERPL-SCNC: 22 MMOL/L (ref 21–32)
CREAT SERPL-MCNC: 0.7 MG/DL (ref 0.6–1.2)
DEPRECATED RDW RBC AUTO: 12.8 % (ref 12.4–15.4)
EKG ATRIAL RATE: 51 BPM
EKG DIAGNOSIS: NORMAL
EKG P AXIS: 36 DEGREES
EKG P-R INTERVAL: 216 MS
EKG Q-T INTERVAL: 480 MS
EKG QRS DURATION: 94 MS
EKG QTC CALCULATION (BAZETT): 442 MS
EKG R AXIS: -67 DEGREES
EKG T AXIS: 46 DEGREES
EKG VENTRICULAR RATE: 51 BPM
EOSINOPHIL # BLD: 0 K/UL (ref 0–0.6)
EOSINOPHIL NFR BLD: 0.3 %
GFR SERPLBLD CREATININE-BSD FMLA CKD-EPI: 88 ML/MIN/{1.73_M2}
GLUCOSE SERPL-MCNC: 96 MG/DL (ref 70–99)
HCT VFR BLD AUTO: 43.4 % (ref 36–48)
HGB BLD-MCNC: 14.8 G/DL (ref 12–16)
LYMPHOCYTES # BLD: 1.9 K/UL (ref 1–5.1)
LYMPHOCYTES NFR BLD: 19.5 %
MCH RBC QN AUTO: 31.2 PG (ref 26–34)
MCHC RBC AUTO-ENTMCNC: 34.1 G/DL (ref 31–36)
MCV RBC AUTO: 91.8 FL (ref 80–100)
MONOCYTES # BLD: 0.6 K/UL (ref 0–1.3)
MONOCYTES NFR BLD: 6.3 %
NEUTROPHILS # BLD: 7 K/UL (ref 1.7–7.7)
NEUTROPHILS NFR BLD: 73.3 %
PLATELET # BLD AUTO: 242 K/UL (ref 135–450)
PMV BLD AUTO: 8.3 FL (ref 5–10.5)
POTASSIUM SERPL-SCNC: 4.3 MMOL/L (ref 3.5–5.1)
RBC # BLD AUTO: 4.73 M/UL (ref 4–5.2)
SODIUM SERPL-SCNC: 141 MMOL/L (ref 136–145)
TROPONIN, HIGH SENSITIVITY: 12 NG/L (ref 0–14)
TROPONIN, HIGH SENSITIVITY: 9 NG/L (ref 0–14)
WBC # BLD AUTO: 9.5 K/UL (ref 4–11)

## 2024-07-26 PROCEDURE — 99285 EMERGENCY DEPT VISIT HI MDM: CPT

## 2024-07-26 PROCEDURE — 85025 COMPLETE CBC W/AUTO DIFF WBC: CPT

## 2024-07-26 PROCEDURE — 84484 ASSAY OF TROPONIN QUANT: CPT

## 2024-07-26 PROCEDURE — 80048 BASIC METABOLIC PNL TOTAL CA: CPT

## 2024-07-26 PROCEDURE — 93005 ELECTROCARDIOGRAM TRACING: CPT | Performed by: EMERGENCY MEDICINE

## 2024-07-26 PROCEDURE — 71046 X-RAY EXAM CHEST 2 VIEWS: CPT

## 2024-07-26 ASSESSMENT — LIFESTYLE VARIABLES
HOW OFTEN DO YOU HAVE A DRINK CONTAINING ALCOHOL: NEVER
HOW MANY STANDARD DRINKS CONTAINING ALCOHOL DO YOU HAVE ON A TYPICAL DAY: PATIENT DOES NOT DRINK

## 2024-07-26 ASSESSMENT — PAIN - FUNCTIONAL ASSESSMENT
PAIN_FUNCTIONAL_ASSESSMENT: NONE - DENIES PAIN
PAIN_FUNCTIONAL_ASSESSMENT: NONE - DENIES PAIN

## 2024-07-26 NOTE — ED NOTES
RN performed orthostatic's  Lying: HR 58, /91  Sitting: HR 57, /87  Standing: HR 62, /92     Marycruz Xiao, SOHAIL  07/26/24 1359

## 2024-07-26 NOTE — DISCHARGE INSTRUCTIONS
We saw you in the emergency department today for lightheadedness.    Your labs, EKG, and chest x-ray were reassuring.    We talked your case over with the on-call physician for your cardiology practice.  We would like to make the following modifications to the way you take your flecainide:    Take your flecainide as needed when you are experiencing bouts of atrial fibrillation only if #1 the average rate is over 110 bpm and #2 the episode lasts for longer than 30 minutes.  The caveat to those 2 stipulations would be that if you are feeling symptomatic then go ahead and take your medication regardless of the rate or duration.    Your cardiology office should reach out to you to schedule a follow-up appointment but we think it would be a good idea for you to call them as well to confirm and be sure nothing falls through the cracks.    Come back to the emergency department to get seen again if you have persistent symptoms, new problems, or any other concerns that cannot be addressed in a timely fashion by your outpatient providers.

## 2024-07-26 NOTE — ED NOTES
Resumed care of patient. Pt is resting comfortably in bed at this time. Pt has no complaints or needs to be met at this time. Pt denies any pain. Pt denies dizziness, fatigue, n/v, abdominal pain, CP, sob. Pt is NSR at this time, BP stable. Pt has been updated on the plan of care and informed of any delays, pt verbalized understanding. Bed locked and in lowest position, call light within reach, will cont to monitor     Marycruz Xiao RN  07/26/24 0990

## 2024-07-29 NOTE — TELEPHONE ENCOUNTER
Called pt and informed her that FW was alright to see her in a month, so scheduled a follow up in Sept per Fernando- pt verbalized understanding but she complained about her lack of understanding about the medication (flecainide) and what to do when she feels sick or be out of rhythm. Read out to her what FW mentioned in her notes about coming to the office to get an EKG and then get a cardioversion depending on what the EKG shows. Pt was not keen coming to the office to get the EKG because she has a Kardia by which she can monitor her rhythm. She wants to talk to either Fernando or Tesas to which I informed her that Fernando was in clinic the whole day. I said I would leave a message in the chart to both Fernando and Tessa and they would get back to her either today or tomorrow to alleviate her confusion.  Thanks

## 2024-07-29 NOTE — TELEPHONE ENCOUNTER
Patient was seen in the emergency room on 7/26/2024 and she was in normal sinus rhythm.  Patient does not need to come to the office for an EKG.  She does not need a cardioversion.  I left a message on machine for patient to call me regarding her flecainide.

## 2024-07-30 NOTE — ED PROVIDER NOTES
[facility]  EMERGENCY DEPARTMENT ENCOUNTER          ATTENDING PHYSICIAN NOTE       Date of evaluation: 7/26/2024    Chief Complaint     Hypotension (Pt from home, per squad initial BP 40/20 and HR 50's pt complained of dizziness and diaphoresis with episode. Pt received 1mg atropine and IVF en route Upon arrival, /79)      History of Present Illness     Debbie Shah is a 79 y.o. female who presents to the emergency department with lightheadedness and an EMS report of hypotension and bradycardia with heart rate in the 50s and blood pressure 40/20.  She received 1 mg of atropine and some IV fluids on route to the hospital and is asymptomatic upon our initial encounter.    Patient has a history of atrial fibrillation for which she has been taking metoprolol for a long time, was prescribed flecainide earlier this month by her electrophysiologist and instructed to take it up to twice daily as needed for episodes of atrial fibrillation.  So far she had not been needing to use it until Monday of this week.  She used it again on Wednesday evening and then woke up this morning around 4 AM with sensation of palpitations and took it before going back to sleep.  She woke up again a little after 7 AM and was up in her kitchen making breakfast when she began to feel lightheaded and diaphoretic.  She sat down at her kitchen table and called her neighbor.  Her neighbor came over and called EMS.    The patient uses a Kardia monitor to track and record when she is feeling palpitations.  Unfortunately, she did not record this morning at 4 AM when she woke up and took her medication.  She captured around 7 AM when she was feeling more symptomatic and this showed atrial fibrillation with an average rate of 103 bpm.    She says that when she has bouts of atrial fibrillation, she typically feels very tired, has aching pain that radiates to her bilateral shoulders and down her arms.  She thinks that she has been more

## 2024-08-30 DIAGNOSIS — G62.0 DRUG-INDUCED POLYNEUROPATHY (HCC): ICD-10-CM

## 2024-08-30 RX ORDER — GABAPENTIN 300 MG/1
CAPSULE ORAL
Qty: 360 CAPSULE | Refills: 3 | Status: SHIPPED | OUTPATIENT
Start: 2024-08-30 | End: 2025-02-28

## 2024-09-12 ENCOUNTER — OFFICE VISIT (OUTPATIENT)
Dept: CARDIOLOGY CLINIC | Age: 79
End: 2024-09-12
Payer: MEDICARE

## 2024-09-12 VITALS
BODY MASS INDEX: 24.86 KG/M2 | SYSTOLIC BLOOD PRESSURE: 124 MMHG | OXYGEN SATURATION: 97 % | HEART RATE: 71 BPM | DIASTOLIC BLOOD PRESSURE: 74 MMHG | WEIGHT: 154 LBS

## 2024-09-12 DIAGNOSIS — R00.0 TACHYCARDIA: ICD-10-CM

## 2024-09-12 DIAGNOSIS — R00.2 PALPITATIONS: ICD-10-CM

## 2024-09-12 DIAGNOSIS — Z51.81 ENCOUNTER FOR MONITORING FLECAINIDE THERAPY: ICD-10-CM

## 2024-09-12 DIAGNOSIS — Z79.899 ENCOUNTER FOR MONITORING FLECAINIDE THERAPY: ICD-10-CM

## 2024-09-12 DIAGNOSIS — I48.0 PAF (PAROXYSMAL ATRIAL FIBRILLATION) (HCC): Primary | ICD-10-CM

## 2024-09-12 DIAGNOSIS — Z79.01 ON CONTINUOUS ORAL ANTICOAGULATION: ICD-10-CM

## 2024-09-12 PROCEDURE — G8400 PT W/DXA NO RESULTS DOC: HCPCS | Performed by: NURSE PRACTITIONER

## 2024-09-12 PROCEDURE — 93000 ELECTROCARDIOGRAM COMPLETE: CPT | Performed by: NURSE PRACTITIONER

## 2024-09-12 PROCEDURE — 1123F ACP DISCUSS/DSCN MKR DOCD: CPT | Performed by: NURSE PRACTITIONER

## 2024-09-12 PROCEDURE — 1036F TOBACCO NON-USER: CPT | Performed by: NURSE PRACTITIONER

## 2024-09-12 PROCEDURE — 99215 OFFICE O/P EST HI 40 MIN: CPT | Performed by: NURSE PRACTITIONER

## 2024-09-12 PROCEDURE — 1090F PRES/ABSN URINE INCON ASSESS: CPT | Performed by: NURSE PRACTITIONER

## 2024-09-12 PROCEDURE — G8420 CALC BMI NORM PARAMETERS: HCPCS | Performed by: NURSE PRACTITIONER

## 2024-09-12 PROCEDURE — G8427 DOCREV CUR MEDS BY ELIG CLIN: HCPCS | Performed by: NURSE PRACTITIONER

## 2024-11-13 RX ORDER — LEVOTHYROXINE SODIUM 125 UG/1
125 TABLET ORAL DAILY
Qty: 90 TABLET | Refills: 3 | Status: SHIPPED | OUTPATIENT
Start: 2024-11-13

## 2024-12-06 DIAGNOSIS — G62.0 DRUG-INDUCED POLYNEUROPATHY (HCC): ICD-10-CM

## 2024-12-06 RX ORDER — GABAPENTIN 300 MG/1
CAPSULE ORAL
Qty: 360 CAPSULE | Refills: 0 | Status: SHIPPED | OUTPATIENT
Start: 2024-12-06 | End: 2025-06-06

## 2024-12-06 NOTE — TELEPHONE ENCOUNTER
Pt states that [facility] will not expedite her gabapentin and she is running out. Pt wants HC to send in her Rx to the [facility] on [address]

## 2025-02-13 DIAGNOSIS — G62.0 DRUG-INDUCED POLYNEUROPATHY (HCC): ICD-10-CM

## 2025-02-14 ENCOUNTER — TELEPHONE (OUTPATIENT)
Dept: PRIMARY CARE CLINIC | Age: 80
End: 2025-02-14

## 2025-02-14 ENCOUNTER — OFFICE VISIT (OUTPATIENT)
Dept: PRIMARY CARE CLINIC | Age: 80
End: 2025-02-14

## 2025-02-14 VITALS
WEIGHT: 151 LBS | BODY MASS INDEX: 24.27 KG/M2 | SYSTOLIC BLOOD PRESSURE: 109 MMHG | TEMPERATURE: 97.5 F | HEART RATE: 65 BPM | HEIGHT: 66 IN | DIASTOLIC BLOOD PRESSURE: 70 MMHG | OXYGEN SATURATION: 97 %

## 2025-02-14 DIAGNOSIS — R19.7 DIARRHEA, UNSPECIFIED TYPE: ICD-10-CM

## 2025-02-14 DIAGNOSIS — R07.89 CHEST TIGHTNESS: Primary | ICD-10-CM

## 2025-02-14 RX ORDER — PREDNISONE 20 MG/1
TABLET ORAL
Qty: 5 TABLET | Refills: 0 | Status: SHIPPED | OUTPATIENT
Start: 2025-02-14 | End: 2025-02-14

## 2025-02-14 RX ORDER — PREDNISONE 20 MG/1
TABLET ORAL
Qty: 9 TABLET | Refills: 0 | Status: SHIPPED | OUTPATIENT
Start: 2025-02-14

## 2025-02-14 RX ORDER — GABAPENTIN 300 MG/1
300 CAPSULE ORAL 4 TIMES DAILY
Qty: 360 CAPSULE | Refills: 0 | Status: SHIPPED | OUTPATIENT
Start: 2025-02-14 | End: 2025-05-15

## 2025-02-14 SDOH — ECONOMIC STABILITY: FOOD INSECURITY: WITHIN THE PAST 12 MONTHS, THE FOOD YOU BOUGHT JUST DIDN'T LAST AND YOU DIDN'T HAVE MONEY TO GET MORE.: NEVER TRUE

## 2025-02-14 SDOH — ECONOMIC STABILITY: FOOD INSECURITY: WITHIN THE PAST 12 MONTHS, YOU WORRIED THAT YOUR FOOD WOULD RUN OUT BEFORE YOU GOT MONEY TO BUY MORE.: NEVER TRUE

## 2025-02-14 ASSESSMENT — PATIENT HEALTH QUESTIONNAIRE - PHQ9
SUM OF ALL RESPONSES TO PHQ QUESTIONS 1-9: 0
2. FEELING DOWN, DEPRESSED OR HOPELESS: NOT AT ALL
SUM OF ALL RESPONSES TO PHQ9 QUESTIONS 1 & 2: 0
1. LITTLE INTEREST OR PLEASURE IN DOING THINGS: NOT AT ALL
SUM OF ALL RESPONSES TO PHQ QUESTIONS 1-9: 0

## 2025-02-14 ASSESSMENT — ENCOUNTER SYMPTOMS
WHEEZING: 0
SHORTNESS OF BREATH: 0
DIARRHEA: 1
CHEST TIGHTNESS: 1
SORE THROAT: 0
ABDOMINAL PAIN: 0
COUGH: 1

## 2025-02-14 NOTE — PROGRESS NOTES
MD Nii   metoprolol tartrate (LOPRESSOR) 25 MG tablet Take 1 tablet by mouth 2 times daily  Nii Mazariegos MD   metoprolol succinate (TOPROL XL) 50 MG extended release tablet Take 2 tablets by mouth daily  Nii Mazariegos MD       Past Medical History:   Diagnosis Date    Atrial fibrillation (HCC)     Breast cancer (HCC) 2000    Right breast CA - Her2/fred +, s/p lumpectomy with chemo/radiation    Hypothyroidism     Neuropathy         Social History     Tobacco Use    Smoking status: Never    Smokeless tobacco: Never   Substance Use Topics    Alcohol use: Not Currently    Drug use: Not Currently     Types: Marijuana (Weed)        Past Surgical History:   Procedure Laterality Date    INCISIONAL BREAST BIOPSY      OTHER SURGICAL HISTORY      cardiac ablation    OTHER SURGICAL HISTORY      catarac    PELVIC LAPAROSCOPY      TONSILLECTOMY          No Known Allergies     Family History   Adopted: Yes        Patient's past medical history, surgical history, family history, medications, and allergies  were all reviewed and updated as appropriate today.    Review of Systems   Constitutional:  Positive for fatigue. Negative for fever.   HENT:  Negative for congestion and sore throat.    Respiratory:  Positive for cough (improved) and chest tightness. Negative for shortness of breath and wheezing.    Cardiovascular:  Negative for chest pain.   Gastrointestinal:  Positive for diarrhea. Negative for abdominal pain.   Musculoskeletal:  Negative for myalgias.   Skin:  Negative for rash and wound.   Neurological:  Positive for weakness (generalized). Negative for dizziness.       /70 (Site: Left Upper Arm, Position: Sitting, Cuff Size: Medium Adult)   Pulse 65   Temp 97.5 °F (36.4 °C)   Ht 1.676 m (5' 6\")   Wt 68.5 kg (151 lb)   SpO2 97%   BMI 24.37 kg/m²      Physical Exam  Constitutional:       Appearance: Normal appearance. She is ill-appearing (mild).   HENT:      Head: Normocephalic

## 2025-02-14 NOTE — TELEPHONE ENCOUNTER
Medication:   Requested Prescriptions     Pending Prescriptions Disp Refills    gabapentin (NEURONTIN) 300 MG capsule [Pharmacy Med Name: GABAPENTIN 300MG CAP] 360 capsule 0     Last Filled:  2/13/25  Last appt: 5/29/2024   Next appt: 2/14/2025

## 2025-02-14 NOTE — TELEPHONE ENCOUNTER
Per pts instructions for her medication there are not enough pills for her to follow. Only 5 pills were sent in, there should be 8. Please call pt when complete

## 2025-02-17 ENCOUNTER — TELEPHONE (OUTPATIENT)
Dept: PRIMARY CARE CLINIC | Age: 80
End: 2025-02-17

## 2025-02-17 NOTE — TELEPHONE ENCOUNTER
KILEY:  Patient called and wanted to just let us know that the new script was sent in for 9 pills and her insurance would not cover it. She paid out of pocket just wanted to make sure we knew because it was a inconvenience with her not feeling well. Bev spoke with her and offered reimbursement, she declined but wanted you to know.

## 2025-02-24 ENCOUNTER — CARE COORDINATION (OUTPATIENT)
Dept: CARE COORDINATION | Age: 80
End: 2025-02-24

## 2025-02-25 NOTE — CARE COORDINATION
Ambulatory Care Coordination Note     2025 11:54 AM     Patient Current Location:  Home: [address]  [place] [facility] [address]     ACM contacted the patient by telephone. Verified name and  with patient as identifiers.     Patient closed (patient declined) from the High Risk Care Management program on 2025.  Patient verbalizes confidence in the ability to self-manage at this time..  Care management goals have been completed. No further Ambulatory Care Manager follow up scheduled.      ACM: Evy Woo RN     PCP/Specialist follow up:   Future Appointments         Provider Specialty Dept Phone    3/6/2025 3:30 PM Nii Mazariegos MD Cardiology [phone]            Follow Up:   No further Ambulatory Care Management follow-up scheduled at this time.  Patient  has Ambulatory Care Manager's contact information for any further questions, concerns or needs.

## 2025-02-25 NOTE — PROGRESS NOTES
Psychiatric: Has a normal mood, affect and behavior     Labs:  Reviewed.     ECG: reviewed, SB 57     Studies:   1. 2 day CAM (2/17-2/19/20): SB with average HR 57 (), 15% AF burden, 4 runs of AT lasting up to 18 beats    2. LUCIAN 7/20/22:   Study Conclusions   - Left atrium: There is no evidence of a thrombus in the atrial     cavity or appendage. The appendage was morphologically a left     appendage. Emptying velocity was mildly reduced.   - No significant valve disease.     No pericardial effusion.   - Left ventricle: Systolic function was normal. The calculated     ejection fraction was in the range of 55% to 60%.     The MCOT, echocardiogram, stress test, and coronary angiography/PCI were reviewed by myself and used for my plan of care.     Thank you for allowing me to participate in the care of Debbie Shah. All questions and concerns were addressed to the patient/family. Alternatives to my treatment were discussed.     IMarylin RN, am scribing for and in the presence of Dr. Nii Mazariegos. 03/06/25 3:56 PM  SOHAIL Aguilera Uma Lakshmanadoss MD, personally performed the services prescribed in this documentation as scribed by Ms. Marylin Andrade RN in my presence and it is both accurate and complete.     Melony Mazariegos MD  Cardiac Electrophysiology  [facility]

## 2025-03-06 ENCOUNTER — OFFICE VISIT (OUTPATIENT)
Dept: CARDIOLOGY CLINIC | Age: 80
End: 2025-03-06

## 2025-03-06 VITALS
DIASTOLIC BLOOD PRESSURE: 74 MMHG | SYSTOLIC BLOOD PRESSURE: 130 MMHG | WEIGHT: 155 LBS | BODY MASS INDEX: 25.02 KG/M2 | HEART RATE: 57 BPM

## 2025-03-06 DIAGNOSIS — Z79.01 ON CONTINUOUS ORAL ANTICOAGULATION: ICD-10-CM

## 2025-03-06 DIAGNOSIS — I48.0 PAF (PAROXYSMAL ATRIAL FIBRILLATION) (HCC): Primary | ICD-10-CM

## 2025-03-06 RX ORDER — METOPROLOL TARTRATE 25 MG/1
12.5 TABLET, FILM COATED ORAL 2 TIMES DAILY
Qty: 90 TABLET | Refills: 3
Start: 2025-03-06

## 2025-03-24 ENCOUNTER — OFFICE VISIT (OUTPATIENT)
Dept: PRIMARY CARE CLINIC | Age: 80
End: 2025-03-24
Payer: MEDICARE

## 2025-03-24 VITALS
HEART RATE: 65 BPM | BODY MASS INDEX: 25.08 KG/M2 | OXYGEN SATURATION: 95 % | WEIGHT: 155.4 LBS | DIASTOLIC BLOOD PRESSURE: 70 MMHG | SYSTOLIC BLOOD PRESSURE: 111 MMHG

## 2025-03-24 DIAGNOSIS — Z00.00 MEDICARE ANNUAL WELLNESS VISIT, SUBSEQUENT: Primary | ICD-10-CM

## 2025-03-24 DIAGNOSIS — I48.19 PERSISTENT ATRIAL FIBRILLATION (HCC): ICD-10-CM

## 2025-03-24 DIAGNOSIS — E03.9 ACQUIRED HYPOTHYROIDISM: ICD-10-CM

## 2025-03-24 PROCEDURE — 1159F MED LIST DOCD IN RCRD: CPT | Performed by: FAMILY MEDICINE

## 2025-03-24 PROCEDURE — 1123F ACP DISCUSS/DSCN MKR DOCD: CPT | Performed by: FAMILY MEDICINE

## 2025-03-24 PROCEDURE — 1160F RVW MEDS BY RX/DR IN RCRD: CPT | Performed by: FAMILY MEDICINE

## 2025-03-24 PROCEDURE — G0439 PPPS, SUBSEQ VISIT: HCPCS | Performed by: FAMILY MEDICINE

## 2025-03-24 ASSESSMENT — PATIENT HEALTH QUESTIONNAIRE - PHQ9
SUM OF ALL RESPONSES TO PHQ QUESTIONS 1-9: 1
SUM OF ALL RESPONSES TO PHQ QUESTIONS 1-9: 1
2. FEELING DOWN, DEPRESSED OR HOPELESS: NOT AT ALL
1. LITTLE INTEREST OR PLEASURE IN DOING THINGS: SEVERAL DAYS
SUM OF ALL RESPONSES TO PHQ QUESTIONS 1-9: 1
SUM OF ALL RESPONSES TO PHQ QUESTIONS 1-9: 1

## 2025-03-24 NOTE — PROGRESS NOTES
Medicare Annual Wellness Visit    Debbie Shah is here for Medicare AWV    Assessment & Plan   Medicare annual wellness visit, subsequent  Acquired hypothyroidism  -     TSH; Future  -     CBC with Auto Differential; Future  -     Basic Metabolic Panel; Future  Persistent atrial fibrillation (HCC)       PAF - stable. Following with EP.   Does not want a dexa scan.   Encouraged to have labs done. Does not want lipid panel.     Return in 1 year (on 3/24/2026) for Medicare AWV.     Subjective     The following acute and/or chronic problems were also addressed today:    Recently saw Dr. Gupta. Recommended to reduce Metoprolol to 12.5 mg BID. States she is taking a whole pill still at night. Still having some fatigue but dizziness has resolved. No recent afib issues. Thinks she had a bad reaction to Flecainide.     Had a severe bronchitis in February that she feels she is still recovering from.     Declines to have a bone density scan done.     Patient's complete Health Risk Assessment and screening values have been reviewed and are found in Flowsheets. The following problems were reviewed today and where indicated follow up appointments were made and/or referrals ordered.    Positive Risk Factor Screenings with Interventions:             General HRA Questions:  Select all that apply: (!) New or Increased Fatigue  Interventions Fatigue:  See above      Inactivity:  On average, how many days per week do you engage in moderate to strenuous exercise (like a brisk walk)?: 1 day (!) Abnormal  On average, how many minutes do you engage in exercise at this level?: 130 min  Interventions:  Recommendations: patient agrees to exercise for at least 150 minutes/week       Hearing Screen:  Do you or your family notice any trouble with your hearing that hasn't been managed with hearing aids?: (!) Yes    Interventions:  Patient has hearing aids and follows with audiology.                  Objective   Vitals:    03/24/25 1155   BP:

## 2025-03-24 NOTE — PATIENT INSTRUCTIONS
Personalized Preventive Plan for Debbie Shah - 3/24/2025  Medicare offers a range of preventive health benefits. Some of the tests and screenings are paid in full while other may be subject to a deductible, co-insurance, and/or copay.  Some of these benefits include a comprehensive review of your medical history including lifestyle, illnesses that may run in your family, and various assessments and screenings as appropriate.  After reviewing your medical record and screening and assessments performed today your provider may have ordered immunizations, labs, imaging, and/or referrals for you.  A list of these orders (if applicable) as well as your Preventive Care list are included within your After Visit Summary for your review.

## 2025-03-25 ENCOUNTER — RESULTS FOLLOW-UP (OUTPATIENT)
Dept: PRIMARY CARE CLINIC | Age: 80
End: 2025-03-25

## 2025-03-25 LAB
ANION GAP SERPL CALCULATED.3IONS-SCNC: 10 MMOL/L (ref 3–16)
BASOPHILS # BLD: 0 K/UL (ref 0–0.2)
BASOPHILS NFR BLD: 0 %
BUN SERPL-MCNC: 15 MG/DL (ref 7–20)
CALCIUM SERPL-MCNC: 9.4 MG/DL (ref 8.3–10.6)
CHLORIDE SERPL-SCNC: 105 MMOL/L (ref 99–110)
CO2 SERPL-SCNC: 26 MMOL/L (ref 21–32)
CREAT SERPL-MCNC: 0.7 MG/DL (ref 0.6–1.2)
DEPRECATED RDW RBC AUTO: 14.6 % (ref 12.4–15.4)
EOSINOPHIL # BLD: 0 K/UL (ref 0–0.6)
EOSINOPHIL NFR BLD: 0 %
GFR SERPLBLD CREATININE-BSD FMLA CKD-EPI: 87 ML/MIN/{1.73_M2}
GLUCOSE SERPL-MCNC: 120 MG/DL (ref 70–99)
HCT VFR BLD AUTO: 43.2 % (ref 36–48)
HGB BLD-MCNC: 14.3 G/DL (ref 12–16)
LYMPHOCYTES # BLD: 2.5 K/UL (ref 1–5.1)
LYMPHOCYTES NFR BLD: 39 %
MCH RBC QN AUTO: 31 PG (ref 26–34)
MCHC RBC AUTO-ENTMCNC: 33 G/DL (ref 31–36)
MCV RBC AUTO: 93.7 FL (ref 80–100)
MONOCYTES # BLD: 0.6 K/UL (ref 0–1.3)
MONOCYTES NFR BLD: 10 %
NEUTROPHILS # BLD: 3.2 K/UL (ref 1.7–7.7)
NEUTROPHILS NFR BLD: 51 %
PLATELET # BLD AUTO: 256 K/UL (ref 135–450)
PLATELET BLD QL SMEAR: ADEQUATE
PMV BLD AUTO: 9.4 FL (ref 5–10.5)
POTASSIUM SERPL-SCNC: 4 MMOL/L (ref 3.5–5.1)
RBC # BLD AUTO: 4.61 M/UL (ref 4–5.2)
RBC MORPH BLD: NORMAL
SLIDE REVIEW: NORMAL
SODIUM SERPL-SCNC: 141 MMOL/L (ref 136–145)
TSH SERPL DL<=0.005 MIU/L-ACNC: 4.39 UIU/ML (ref 0.27–4.2)
WBC # BLD AUTO: 6.3 K/UL (ref 4–11)

## 2025-03-25 RX ORDER — LEVOTHYROXINE SODIUM 112 UG/1
112 TABLET ORAL DAILY
Qty: 90 TABLET | Refills: 3 | Status: SHIPPED | OUTPATIENT
Start: 2025-03-25

## 2025-04-23 ENCOUNTER — OFFICE VISIT (OUTPATIENT)
Dept: PRIMARY CARE CLINIC | Age: 80
End: 2025-04-23

## 2025-04-23 VITALS
DIASTOLIC BLOOD PRESSURE: 65 MMHG | HEART RATE: 78 BPM | WEIGHT: 151.4 LBS | SYSTOLIC BLOOD PRESSURE: 106 MMHG | BODY MASS INDEX: 24.44 KG/M2 | OXYGEN SATURATION: 96 %

## 2025-04-23 DIAGNOSIS — G62.0 DRUG-INDUCED POLYNEUROPATHY: Primary | ICD-10-CM

## 2025-04-23 DIAGNOSIS — F51.04 CHRONIC INSOMNIA: ICD-10-CM

## 2025-04-23 RX ORDER — TRAZODONE HYDROCHLORIDE 50 MG/1
50 TABLET ORAL NIGHTLY
Qty: 30 TABLET | Refills: 0 | Status: SHIPPED | OUTPATIENT
Start: 2025-04-23
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
## 2025-05-19 DIAGNOSIS — G62.0 DRUG-INDUCED POLYNEUROPATHY: ICD-10-CM

## 2025-05-19 DIAGNOSIS — F51.04 CHRONIC INSOMNIA: ICD-10-CM

## 2025-05-19 RX ORDER — GABAPENTIN 300 MG/1
300 CAPSULE ORAL 4 TIMES DAILY
Qty: 360 CAPSULE | Refills: 1 | Status: SHIPPED | OUTPATIENT
Start: 2025-05-19 | End: 2025-11-15

## 2025-05-19 NOTE — TELEPHONE ENCOUNTER
Medication:   Requested Prescriptions     Pending Prescriptions Disp Refills    gabapentin (NEURONTIN) 300 MG capsule 360 capsule 0     Sig: Take 1 capsule by mouth 4 times daily for 90 days. Take     Last Filled:   02/14/2025     Last appt: 4/23/2025   Next appt: Visit date not found

## 2025-05-19 NOTE — TELEPHONE ENCOUNTER
Patient also stated that her sleeping medication Prednisone is to strong and that she has been cutting it in half PT would like a lower dose.

## 2025-05-21 RX ORDER — TRAZODONE HYDROCHLORIDE 50 MG/1
50 TABLET ORAL NIGHTLY
Qty: 90 TABLET | Refills: 1 | Status: SHIPPED | OUTPATIENT
Start: 2025-05-21

## 2025-05-21 NOTE — TELEPHONE ENCOUNTER
Patient called and informed of message. She did confirm that she was referring to the trazodone. She states she will just have to cut it in half still then. She did want to know if we can send a refill into [facility].     She doesn't need the medication she just wants an order on hand.

## 2025-07-11 ENCOUNTER — TELEPHONE (OUTPATIENT)
Dept: CARDIOLOGY CLINIC | Age: 80
End: 2025-07-11

## 2025-07-11 DIAGNOSIS — I48.19 PERSISTENT ATRIAL FIBRILLATION (HCC): Primary | ICD-10-CM

## 2025-07-11 RX ORDER — METOPROLOL TARTRATE 25 MG/1
25 TABLET, FILM COATED ORAL 2 TIMES DAILY
Qty: 180 TABLET | Refills: 3 | Status: SHIPPED | OUTPATIENT
Start: 2025-07-11

## 2025-07-11 NOTE — TELEPHONE ENCOUNTER
Pt called stating she has been taking 0.5 tab of metoprolol in the AM and 1 tab in PM. Directions read to take 0.5 AM and 0.5 PM. Pt states she was worried to decrease it that much and has felt stable with the dose she has been taking. She is now running out of medication and does not think the pharmacy will fill it since it is technically too soon. Please advise     [phone]

## 2025-07-11 NOTE — PROGRESS NOTES
Per gordo Gutierrez to order lopressor 12.5 mg am , 25 mg pm   Pt aware; refill sent to preferred RX.

## 2025-07-11 NOTE — TELEPHONE ENCOUNTER
Spoke w/pt at length; states she has been taking 12.5 mg Lopressor QAM and 25 mg QPM, feels fine, states the 'full tab at nite helps me from getting an elevated HR during my sleep; I sleep better'. States dizziness is minimal 'but due to bronchitis'.  Advised will review w/EP NP and if ok will send refill RX to [facility] at dosing above. Verb understanding.      Per Dr Gupta OV 3/2025:  Assessment:  PAF, on Eliquis, Lopressor  S/p cryoablation (7/2022, [facility])  Hypothyroidism, on levothyroxine  Peripheral neuropathy     Plan:  Decrease metoprolol to 12.5 mg BID to see if her dizziness improves  Encouraged her to increase her water intake  Continue to monitor for AF with Francine  Follow up with EP NP in 6 months

## 2025-07-18 DIAGNOSIS — I48.19 PERSISTENT ATRIAL FIBRILLATION (HCC): ICD-10-CM

## 2025-07-18 RX ORDER — METOPROLOL TARTRATE 25 MG/1
TABLET, FILM COATED ORAL
Qty: 180 TABLET | Refills: 3 | Status: SHIPPED | OUTPATIENT
Start: 2025-07-18

## 2025-07-18 RX ORDER — METOPROLOL TARTRATE 25 MG/1
TABLET, FILM COATED ORAL
Qty: 30 TABLET | Refills: 3 | Status: SHIPPED | OUTPATIENT
Start: 2025-07-18 | End: 2025-07-18 | Stop reason: SDUPTHER

## 2025-07-18 NOTE — TELEPHONE ENCOUNTER
Patient called today stating she recievd a message from her pharmacy stating her doctor office needs to call [phone] so that she can get her prescription.     She is also requesting a temporary supply to be sent to her local [facility] pharmacy.

## 2025-07-22 NOTE — TELEPHONE ENCOUNTER
Rx refill request. Pt is almost out of medication    Medication  apixaban (ELIQUIS) 5 MG TABS tablet [[id number]]  apixaban (ELIQUIS) 5 MG TABS tablet [[id number]]    Order Details  Dose: 5 mg Route: Oral Frequency: 2 TIMES DAILY   Dispense Quantity: 180 tablet Refills: 3          Sig: Take 1 tablet by mouth 2 times daily       Pharmacy    [facility] - [facility] [address] - P [phone] - F [phone]  [address], [facility] [address]  Phone: [phone]  Fax: [phone]       LAST OV: 3/6/25  NEXT OV: 9/18/25

## 2025-08-13 ENCOUNTER — OFFICE VISIT (OUTPATIENT)
Dept: PRIMARY CARE CLINIC | Age: 80
End: 2025-08-13
Payer: MEDICARE

## 2025-08-13 VITALS
SYSTOLIC BLOOD PRESSURE: 95 MMHG | WEIGHT: 151 LBS | OXYGEN SATURATION: 97 % | DIASTOLIC BLOOD PRESSURE: 59 MMHG | HEART RATE: 72 BPM | BODY MASS INDEX: 24.37 KG/M2

## 2025-08-13 DIAGNOSIS — M79.674 GREAT TOE PAIN, RIGHT: Primary | ICD-10-CM

## 2025-08-13 PROCEDURE — 99213 OFFICE O/P EST LOW 20 MIN: CPT | Performed by: FAMILY MEDICINE

## 2025-08-13 PROCEDURE — 1123F ACP DISCUSS/DSCN MKR DOCD: CPT | Performed by: FAMILY MEDICINE

## 2025-08-13 PROCEDURE — G8428 CUR MEDS NOT DOCUMENT: HCPCS | Performed by: FAMILY MEDICINE

## 2025-08-13 PROCEDURE — G8420 CALC BMI NORM PARAMETERS: HCPCS | Performed by: FAMILY MEDICINE

## 2025-08-13 PROCEDURE — G8400 PT W/DXA NO RESULTS DOC: HCPCS | Performed by: FAMILY MEDICINE

## 2025-08-13 PROCEDURE — 1090F PRES/ABSN URINE INCON ASSESS: CPT | Performed by: FAMILY MEDICINE

## 2025-08-13 PROCEDURE — 1036F TOBACCO NON-USER: CPT | Performed by: FAMILY MEDICINE
